# Patient Record
Sex: FEMALE | Race: WHITE | NOT HISPANIC OR LATINO
[De-identification: names, ages, dates, MRNs, and addresses within clinical notes are randomized per-mention and may not be internally consistent; named-entity substitution may affect disease eponyms.]

---

## 2020-11-09 ENCOUNTER — TRANSCRIPTION ENCOUNTER (OUTPATIENT)
Age: 65
End: 2020-11-09

## 2022-05-19 ENCOUNTER — OUTPATIENT (OUTPATIENT)
Dept: OUTPATIENT SERVICES | Facility: HOSPITAL | Age: 67
LOS: 1 days | End: 2022-05-19
Payer: MEDICARE

## 2022-05-19 VITALS
DIASTOLIC BLOOD PRESSURE: 70 MMHG | HEART RATE: 67 BPM | RESPIRATION RATE: 16 BRPM | TEMPERATURE: 97 F | HEIGHT: 62 IN | SYSTOLIC BLOOD PRESSURE: 133 MMHG | OXYGEN SATURATION: 98 % | WEIGHT: 134.92 LBS

## 2022-05-19 DIAGNOSIS — M20.41 OTHER HAMMER TOE(S) (ACQUIRED), RIGHT FOOT: ICD-10-CM

## 2022-05-19 DIAGNOSIS — E89.2 POSTPROCEDURAL HYPOPARATHYROIDISM: Chronic | ICD-10-CM

## 2022-05-19 DIAGNOSIS — M79.671 PAIN IN RIGHT FOOT: ICD-10-CM

## 2022-05-19 DIAGNOSIS — Z98.890 OTHER SPECIFIED POSTPROCEDURAL STATES: Chronic | ICD-10-CM

## 2022-05-19 LAB
ANION GAP SERPL CALC-SCNC: 11 MMOL/L — SIGNIFICANT CHANGE UP (ref 7–14)
BUN SERPL-MCNC: 22 MG/DL — SIGNIFICANT CHANGE UP (ref 7–23)
CALCIUM SERPL-MCNC: 9.7 MG/DL — SIGNIFICANT CHANGE UP (ref 8.4–10.5)
CHLORIDE SERPL-SCNC: 104 MMOL/L — SIGNIFICANT CHANGE UP (ref 98–107)
CO2 SERPL-SCNC: 26 MMOL/L — SIGNIFICANT CHANGE UP (ref 22–31)
CREAT SERPL-MCNC: 0.81 MG/DL — SIGNIFICANT CHANGE UP (ref 0.5–1.3)
EGFR: 79 ML/MIN/1.73M2 — SIGNIFICANT CHANGE UP
GLUCOSE SERPL-MCNC: 88 MG/DL — SIGNIFICANT CHANGE UP (ref 70–99)
HCT VFR BLD CALC: 39.8 % — SIGNIFICANT CHANGE UP (ref 34.5–45)
HGB BLD-MCNC: 12.9 G/DL — SIGNIFICANT CHANGE UP (ref 11.5–15.5)
MCHC RBC-ENTMCNC: 29.2 PG — SIGNIFICANT CHANGE UP (ref 27–34)
MCHC RBC-ENTMCNC: 32.4 GM/DL — SIGNIFICANT CHANGE UP (ref 32–36)
MCV RBC AUTO: 90 FL — SIGNIFICANT CHANGE UP (ref 80–100)
NRBC # BLD: 0 /100 WBCS — SIGNIFICANT CHANGE UP
NRBC # FLD: 0 K/UL — SIGNIFICANT CHANGE UP
PLATELET # BLD AUTO: 299 K/UL — SIGNIFICANT CHANGE UP (ref 150–400)
POTASSIUM SERPL-MCNC: 5 MMOL/L — SIGNIFICANT CHANGE UP (ref 3.5–5.3)
POTASSIUM SERPL-SCNC: 5 MMOL/L — SIGNIFICANT CHANGE UP (ref 3.5–5.3)
RBC # BLD: 4.42 M/UL — SIGNIFICANT CHANGE UP (ref 3.8–5.2)
RBC # FLD: 12.7 % — SIGNIFICANT CHANGE UP (ref 10.3–14.5)
SODIUM SERPL-SCNC: 141 MMOL/L — SIGNIFICANT CHANGE UP (ref 135–145)
WBC # BLD: 5.58 K/UL — SIGNIFICANT CHANGE UP (ref 3.8–10.5)
WBC # FLD AUTO: 5.58 K/UL — SIGNIFICANT CHANGE UP (ref 3.8–10.5)

## 2022-05-19 PROCEDURE — 93010 ELECTROCARDIOGRAM REPORT: CPT

## 2022-05-19 NOTE — H&P PST ADULT - PROBLEM SELECTOR PLAN 1
Patient tentatively scheduled for scheduled for right foot interphalangeal hallux fusion for 6/8/22. Pre-op instructions provided. Pt given verbal and written instructions with teach back on chlorhexidine shampoo and pepcid. Pt verbalized understanding with return demonstration.     Pt strongly advised to follow up with surgeon to discuss COVID testing requirements prior to procedure.     68 y/o F scheduled for a low risk procedure, no further evaluations requested.

## 2022-05-19 NOTE — H&P PST ADULT - NSANTHOSAYNRD_GEN_A_CORE
No. LLUVIA screening performed.  STOP BANG Legend: 0-2 = LOW Risk; 3-4 = INTERMEDIATE Risk; 5-8 = HIGH Risk

## 2022-05-19 NOTE — H&P PST ADULT - NSICDXPASTSURGICALHX_GEN_ALL_CORE_FT
PAST SURGICAL HISTORY:   delivery delivered     Cyst of Finger, Left thumb, tendon release Excision    D & C     H/O parathyroidectomy 2013    History of knee sprain knee arthroscopy 2009    History of Tonsillectomy     S/P Abdominoplasty     S/P bilateral breast reduction 2019    S/P Carpal Tunnel Release, right & trigger release right index finger     S/P Carpal Tunnel Release, trigger finger release, middle finger, Left     S/P Laminectomy L-5   L4 - L5-   L2-5, S1- 2003    S/P Laminectomy C4-7    Trigger Finger Release right middle    Uterine hypertrophy D and C -

## 2022-05-19 NOTE — H&P PST ADULT - MAMMOGRAM, LAST, PROFILE
Problem: Knowledge Deficit  Goal: Knowledge of disease process/condition, treatment plan, diagnostic tests, and medications will improve  Outcome: PROGRESSING AS EXPECTED  Patient updated on plan of care. All questions answered. Patient verbalized understanding.        2022

## 2022-05-19 NOTE — H&P PST ADULT - NEGATIVE ENMT SYMPTOMS
no hearing difficulty/no ear pain/no tinnitus/no sinus symptoms/no nose bleeds/no throat pain/no dysphagia

## 2022-05-19 NOTE — H&P PST ADULT - NSICDXPASTMEDICALHX_GEN_ALL_CORE_FT
PAST MEDICAL HISTORY:  Anxiety     Cervical disc disease     Endometrial Hyperplasia     H/O neuropathy     Hematuria, Microscopic     Lumbar Disc Disorder     Neuropathy 2003 - lower extremities    Osteoporosis     Sinusitis last 3 weeks ago    Vertigo     Vitamin D deficiency

## 2022-05-19 NOTE — H&P PST ADULT - HISTORY OF PRESENT ILLNESS
67 year old female presents to presurgical testing with diagnosis of right foot pain and other hammer toes scheduled for right foot interphalangeal hallux fusion. Pt with right foot pain since 10/2021, had a right hallux fracture, with persistent symptoms despite conservative management.

## 2022-05-19 NOTE — H&P PST ADULT - NSICDXFAMILYHX_GEN_ALL_CORE_FT
FAMILY HISTORY:  Father  Still living? Unknown  FH: type 2 diabetes, Age at diagnosis: Age Unknown    Mother  Still living? Unknown  FH: lymphoma, Age at diagnosis: Age Unknown

## 2022-06-07 ENCOUNTER — TRANSCRIPTION ENCOUNTER (OUTPATIENT)
Age: 67
End: 2022-06-07

## 2022-06-08 ENCOUNTER — TRANSCRIPTION ENCOUNTER (OUTPATIENT)
Age: 67
End: 2022-06-08

## 2022-06-08 ENCOUNTER — RESULT REVIEW (OUTPATIENT)
Age: 67
End: 2022-06-08

## 2022-06-08 ENCOUNTER — OUTPATIENT (OUTPATIENT)
Dept: OUTPATIENT SERVICES | Facility: HOSPITAL | Age: 67
LOS: 1 days | Discharge: HOME | End: 2022-06-08
Payer: MEDICARE

## 2022-06-08 VITALS
RESPIRATION RATE: 16 BRPM | SYSTOLIC BLOOD PRESSURE: 137 MMHG | DIASTOLIC BLOOD PRESSURE: 70 MMHG | HEART RATE: 65 BPM | OXYGEN SATURATION: 99 % | TEMPERATURE: 97 F

## 2022-06-08 VITALS
HEART RATE: 69 BPM | SYSTOLIC BLOOD PRESSURE: 140 MMHG | RESPIRATION RATE: 20 BRPM | DIASTOLIC BLOOD PRESSURE: 65 MMHG | HEIGHT: 62 IN | OXYGEN SATURATION: 98 % | TEMPERATURE: 96 F | WEIGHT: 134.92 LBS

## 2022-06-08 DIAGNOSIS — Z98.890 OTHER SPECIFIED POSTPROCEDURAL STATES: Chronic | ICD-10-CM

## 2022-06-08 DIAGNOSIS — M20.41 OTHER HAMMER TOE(S) (ACQUIRED), RIGHT FOOT: ICD-10-CM

## 2022-06-08 DIAGNOSIS — E89.2 POSTPROCEDURAL HYPOPARATHYROIDISM: Chronic | ICD-10-CM

## 2022-06-08 PROBLEM — R42 DIZZINESS AND GIDDINESS: Chronic | Status: ACTIVE | Noted: 2022-05-19

## 2022-06-08 PROBLEM — Z86.69 PERSONAL HISTORY OF OTHER DISEASES OF THE NERVOUS SYSTEM AND SENSE ORGANS: Chronic | Status: ACTIVE | Noted: 2022-05-19

## 2022-06-08 PROBLEM — F41.9 ANXIETY DISORDER, UNSPECIFIED: Chronic | Status: ACTIVE | Noted: 2022-05-19

## 2022-06-08 PROBLEM — M81.0 AGE-RELATED OSTEOPOROSIS WITHOUT CURRENT PATHOLOGICAL FRACTURE: Chronic | Status: ACTIVE | Noted: 2022-05-19

## 2022-06-08 PROCEDURE — 88311 DECALCIFY TISSUE: CPT | Mod: 26

## 2022-06-08 PROCEDURE — 88304 TISSUE EXAM BY PATHOLOGIST: CPT | Mod: 26

## 2022-06-08 PROCEDURE — 73630 X-RAY EXAM OF FOOT: CPT | Mod: 26,RT

## 2022-06-08 DEVICE — IMPLANTABLE DEVICE: Type: IMPLANTABLE DEVICE | Status: FUNCTIONAL

## 2022-06-08 NOTE — ASU PREOPERATIVE ASSESSMENT, ADULT (IPARK ONLY) - FALL HARM RISK - UNIVERSAL INTERVENTIONS
Bed in lowest position, wheels locked, appropriate side rails in place/Call bell, personal items and telephone in reach/Instruct patient to call for assistance before getting out of bed or chair/Non-slip footwear when patient is out of bed/[place] to call system/Physically safe environment - no spills, clutter or unnecessary equipment/Purposeful Proactive Rounding/Room/bathroom lighting operational, light cord in reach

## 2022-06-08 NOTE — ASU DISCHARGE PLAN (ADULT/PEDIATRIC) - CARE PROVIDER_API CALL
Russel Bustos (DPM)  Podiatric Medicine and Surgery  [address] [place] [address]  Phone: [phone]  Fax: [phone]  Follow Up Time:

## 2022-06-08 NOTE — ASU DISCHARGE PLAN (ADULT/PEDIATRIC) - ASU DC SPECIAL INSTRUCTIONSFT
Weight bear as tolerated to right foot in surgical shoe  Keep dressing clean, dry and intact until follow up  Take pain medications as needed for pain   Follow up with Dr. Bustos within 1 week of discharge

## 2022-06-08 NOTE — ASU DISCHARGE PLAN (ADULT/PEDIATRIC) - NS MD DC FALL RISK RISK
For information on Fall & Injury Prevention, visit: [email or web address] OR  [email or web address] OR  https://www.cdc.gov/steadi/patient.html

## 2022-06-20 LAB — SURGICAL PATHOLOGY STUDY: SIGNIFICANT CHANGE UP

## 2023-07-11 ENCOUNTER — NON-APPOINTMENT (OUTPATIENT)
Age: 68
End: 2023-07-11

## 2023-07-13 NOTE — ASU PREOPERATIVE ASSESSMENT, ADULT (IPARK ONLY) - PERSONAL BELONGINGS
I refilled metrogel 0.75% . Please call patient and ask her to  make appt with [facility] for further refills and annual pap smears.  [phone] lock

## 2023-08-02 ENCOUNTER — APPOINTMENT (OUTPATIENT)
Dept: ORTHOPEDIC SURGERY | Facility: CLINIC | Age: 68
End: 2023-08-02
Payer: MEDICARE

## 2023-08-02 VITALS
SYSTOLIC BLOOD PRESSURE: 146 MMHG | HEIGHT: 62 IN | WEIGHT: 129 LBS | DIASTOLIC BLOOD PRESSURE: 82 MMHG | TEMPERATURE: 98.5 F | BODY MASS INDEX: 23.74 KG/M2 | OXYGEN SATURATION: 97 % | HEART RATE: 77 BPM

## 2023-08-02 DIAGNOSIS — Z86.39 PERSONAL HISTORY OF OTHER ENDOCRINE, NUTRITIONAL AND METABOLIC DISEASE: ICD-10-CM

## 2023-08-02 DIAGNOSIS — Z87.39 PERSONAL HISTORY OF OTHER DISEASES OF THE MUSCULOSKELETAL SYSTEM AND CONNECTIVE TISSUE: ICD-10-CM

## 2023-08-02 DIAGNOSIS — Z78.9 OTHER SPECIFIED HEALTH STATUS: ICD-10-CM

## 2023-08-02 DIAGNOSIS — M48.062 SPINAL STENOSIS, LUMBAR REGION WITH NEUROGENIC CLAUDICATION: ICD-10-CM

## 2023-08-02 DIAGNOSIS — Z82.62 FAMILY HISTORY OF OSTEOPOROSIS: ICD-10-CM

## 2023-08-02 DIAGNOSIS — Z86.69 PERSONAL HISTORY OF OTHER DISEASES OF THE NERVOUS SYSTEM AND SENSE ORGANS: ICD-10-CM

## 2023-08-02 PROCEDURE — 99204 OFFICE O/P NEW MOD 45 MIN: CPT

## 2023-08-02 RX ORDER — DULOXETINE HYDROCHLORIDE 30 MG/1
30 CAPSULE, DELAYED RELEASE PELLETS ORAL
Refills: 0 | Status: ACTIVE | COMMUNITY

## 2023-08-02 RX ORDER — ADHESIVE TAPE 3"X 2.3 YD
50 MCG TAPE, NON-MEDICATED TOPICAL
Refills: 0 | Status: ACTIVE | COMMUNITY

## 2023-08-02 RX ORDER — ROSUVASTATIN CALCIUM 5 MG/1
5 TABLET, FILM COATED ORAL
Refills: 0 | Status: ACTIVE | COMMUNITY

## 2023-08-02 RX ORDER — MULTIVIT-MIN/FOLIC/VIT K/LYCOP 400-300MCG
25 MCG TABLET ORAL
Refills: 0 | Status: ACTIVE | COMMUNITY

## 2023-08-09 PROBLEM — M48.062 SPINAL STENOSIS OF LUMBAR REGION WITH NEUROGENIC CLAUDICATION: Status: ACTIVE | Noted: 2023-08-09

## 2023-08-09 NOTE — HISTORY OF PRESENT ILLNESS
[de-identified] : Pt presents with low back and bilateral leg pain.  Had lumbar sx in 1974.  In 1995, had a fall and another spine surgery.  In 2001 had C4-7 spinal fusion.   In 2003, laminectomy L4-5.  L low back pain has increased in last 3 months.  2 weeks ago, pain radiated to R lateral foot with electric shocks.   Went to ED, given morphine, medrol dose junaid, rx PT.   She is unable to walk 15 minutes as she becomes fatigued. Pain radiates LLE laterally.  H/o L GT bursitis, had steroid inj few weeks ago.

## 2023-08-09 NOTE — PHYSICAL EXAM
[de-identified] : NVI, except bilateral tib ant 4/5.  L lateral calf with decreased sensation.  +TTP L GT bursa.  [de-identified] : MRI lumbar spine 6/12/2023:  L1-2 severe central spinal stenosis, L3-4 R foraminal stenosis, L4-5 bilateral severe foraminal stenosis  Scoliosis xray 6/7/2023:  no gross instability

## 2023-08-09 NOTE — PHYSICAL EXAM
[de-identified] : NVI, except bilateral tib ant 4/5.  L lateral calf with decreased sensation.  +TTP L GT bursa.  [de-identified] : MRI lumbar spine 6/12/2023:  L1-2 severe central spinal stenosis, L3-4 R foraminal stenosis, L4-5 bilateral severe foraminal stenosis  Scoliosis xray 6/7/2023:  no gross instability

## 2023-08-09 NOTE — DISCUSSION/SUMMARY
[Surgical risks reviewed] : Surgical risks reviewed [de-identified] : A lengthy discussion was held with the patient regarding her condition.  We discussed nonoperative treatment strategies including physical therapy, pharmacologic management and steroid injections.  We discussed surgical options (decompression L1-2, L3-4, PSF D&I tlifs L4-S1) and the attendant benefits, alternatives, and risks, including but not limited to infection, bleeding, persistent pain, persistent neurologic deficit, neurologic injury, adjacent segment degeneration, and the need for future surgery.  The patient's questions were sought and answered satisfactorily.  IElisa NP am acting as a scribe for Dr. Frank Schwab.

## 2023-08-09 NOTE — HISTORY OF PRESENT ILLNESS
[de-identified] : Pt presents with low back and bilateral leg pain.  Had lumbar sx in 1974.  In 1995, had a fall and another spine surgery.  In 2001 had C4-7 spinal fusion.   In 2003, laminectomy L4-5.  L low back pain has increased in last 3 months.  2 weeks ago, pain radiated to R lateral foot with electric shocks.   Went to ED, given morphine, medrol dose junaid, rx PT.   She is unable to walk 15 minutes as she becomes fatigued. Pain radiates LLE laterally.  H/o L GT bursitis, had steroid inj few weeks ago.

## 2023-08-09 NOTE — REASON FOR VISIT
[Back Pain] : back pain [Radiculopathy] : radiculopathy [Initial Visit] : an initial visit for [Neck Pain] : neck pain

## 2023-08-09 NOTE — DISCUSSION/SUMMARY
[Surgical risks reviewed] : Surgical risks reviewed [de-identified] : A lengthy discussion was held with the patient regarding her condition.  We discussed nonoperative treatment strategies including physical therapy, pharmacologic management and steroid injections.  We discussed surgical options (decompression L1-2, L3-4, PSF D&I tlifs L4-S1) and the attendant benefits, alternatives, and risks, including but not limited to infection, bleeding, persistent pain, persistent neurologic deficit, neurologic injury, adjacent segment degeneration, and the need for future surgery.  The patient's questions were sought and answered satisfactorily.  IElisa NP am acting as a scribe for Dr. Frank Schwab.

## 2023-10-16 ENCOUNTER — TRANSCRIPTION ENCOUNTER (OUTPATIENT)
Age: 68
End: 2023-10-16

## 2023-10-16 VITALS
OXYGEN SATURATION: 97 % | RESPIRATION RATE: 16 BRPM | WEIGHT: 134.92 LBS | HEIGHT: 62 IN | SYSTOLIC BLOOD PRESSURE: 164 MMHG | HEART RATE: 77 BPM | DIASTOLIC BLOOD PRESSURE: 72 MMHG | TEMPERATURE: 97 F

## 2023-10-16 RX ORDER — GABAPENTIN 400 MG/1
1 CAPSULE ORAL
Qty: 0 | Refills: 0 | DISCHARGE

## 2023-10-16 RX ORDER — POVIDONE-IODINE 5 %
1 AEROSOL (ML) TOPICAL ONCE
Refills: 0 | Status: COMPLETED | OUTPATIENT
Start: 2023-10-17 | End: 2023-10-17

## 2023-10-16 RX ORDER — ALENDRONATE SODIUM 70 MG/1
1 TABLET ORAL
Qty: 0 | Refills: 0 | DISCHARGE

## 2023-10-16 RX ORDER — CHLORHEXIDINE GLUCONATE 213 G/1000ML
1 SOLUTION TOPICAL EVERY 12 HOURS
Refills: 0 | Status: DISCONTINUED | OUTPATIENT
Start: 2023-10-17 | End: 2023-10-22

## 2023-10-16 NOTE — H&P ADULT - PROBLEM SELECTOR PLAN 1
Admit to Orthopedic Service for elective posterior spinal decompression fusion L3-S1, TLIF L5-S1, minimally invasive decompression L1-L2     Medically cleared and optimized for surgery by Dr. Mira Kirkpatrick

## 2023-10-16 NOTE — PATIENT PROFILE ADULT - FALL HARM RISK - HARM RISK INTERVENTIONS

## 2023-10-16 NOTE — PATIENT PROFILE ADULT - DO YOU LACK THE NECESSARY SUPPORT TO HELP YOU COPE WITH LIFE CHALLENGES?
Dr Barry [place], Please refuse all medication requests in the is encounter  It has been corrected and sent in a separate encounter  This encounter was created in error - please disregard  no

## 2023-10-16 NOTE — PRE-OP CHECKLIST - 2.
patient used Hibiclens ones at home but but had a rash. Patient refused CHG wipes in pre op due to rash. (4) no impairment

## 2023-10-16 NOTE — H&P ADULT - NSHPLABSRESULTS_GEN_ALL_CORE
Preop CBC, BMP, PT/INR, PTT , UA within normal range and reviewed per medical clearance  Cr- 0.81  Preop CXR within normal limits and reviewed per medical clearance   Preop EKG 68bpm NSR within normal limits and reviewed per medical clearance  3M: DOS  T + S: DOS

## 2023-10-16 NOTE — H&P ADULT - NSHPPHYSICALEXAM_GEN_ALL_CORE
Gen: 68F NAD  MSK: Decreased lumbar spine ROM secondary to pain    Rest of PE per MD clearance Gen: 68F NAD  MSK: Decreased lumbar ROM secondary to pain  Skin without erythema, ecchymosis, abrasions or lesions, signs of infection  Calves soft, nontender bilaterally   Sensation intact to light touch left lower extremities; reports numbness at right shin and top of foot when compared to left  Pulses: DP2+ bilat LE brisk capillary refill  SLR intact bilat LE; mild decrease in strength left knee flex/ext compared to right; EHL/FHL/TA/GS 5/5 bilaterally     Rest of PE per MD clearance

## 2023-10-16 NOTE — H&P ADULT - HISTORY OF PRESENT ILLNESS
68F c/o lower back pain x     Presents today for a posterior spinal decompression fusion L3-S1, TLIF L5-S1, minimally invasive decompression L1-L2  68F c/o lower back pain x several years. She has had 4 previous spine surgeries and multiple other surgeries. She comes for surgery today because of continued pain in the back that radiates down the legs, L>R. Pain radiates down the lateral left leg and into the toes. She also notes pain at the left lateral hip region. She notes history of neuropathy in her bilateral feet.     Denies PMHx of DVT/PE, MI, stroke, DM2.    Presents today for a posterior spinal decompression fusion L3-S1, TLIF L5-S1, minimally invasive decompression L1-L2.    Patient cannot take Tramadol as she develops extreme pruritus. She has taken Oxycodone in the past without negative side effects. She has not tried Dilaudid. She believes she once received a dose of Morphine in the ED without negative side effects. She has taken Tizanadine without side effects.

## 2023-10-17 ENCOUNTER — INPATIENT (INPATIENT)
Facility: HOSPITAL | Age: 68
LOS: 4 days | Discharge: HOME HEALTH | DRG: 454 | End: 2023-10-22
Attending: ORTHOPAEDIC SURGERY | Admitting: ORTHOPAEDIC SURGERY
Payer: MEDICARE

## 2023-10-17 ENCOUNTER — APPOINTMENT (OUTPATIENT)
Dept: ORTHOPEDIC SURGERY | Facility: HOSPITAL | Age: 68
End: 2023-10-17
Payer: MEDICARE

## 2023-10-17 DIAGNOSIS — E55.9 VITAMIN D DEFICIENCY, UNSPECIFIED: ICD-10-CM

## 2023-10-17 DIAGNOSIS — F41.9 ANXIETY DISORDER, UNSPECIFIED: ICD-10-CM

## 2023-10-17 DIAGNOSIS — R42 DIZZINESS AND GIDDINESS: ICD-10-CM

## 2023-10-17 DIAGNOSIS — E89.2 POSTPROCEDURAL HYPOPARATHYROIDISM: Chronic | ICD-10-CM

## 2023-10-17 DIAGNOSIS — G62.9 POLYNEUROPATHY, UNSPECIFIED: ICD-10-CM

## 2023-10-17 DIAGNOSIS — Z98.890 OTHER SPECIFIED POSTPROCEDURAL STATES: Chronic | ICD-10-CM

## 2023-10-17 DIAGNOSIS — J32.9 CHRONIC SINUSITIS, UNSPECIFIED: ICD-10-CM

## 2023-10-17 DIAGNOSIS — M48.061 SPINAL STENOSIS, LUMBAR REGION WITHOUT NEUROGENIC CLAUDICATION: ICD-10-CM

## 2023-10-17 LAB
BLD GP AB SCN SERPL QL: NEGATIVE — SIGNIFICANT CHANGE UP
BLD GP AB SCN SERPL QL: NEGATIVE — SIGNIFICANT CHANGE UP
GLUCOSE BLDC GLUCOMTR-MCNC: 120 MG/DL — HIGH (ref 70–99)
GLUCOSE BLDC GLUCOMTR-MCNC: 120 MG/DL — HIGH (ref 70–99)
RH IG SCN BLD-IMP: POSITIVE — SIGNIFICANT CHANGE UP
RH IG SCN BLD-IMP: POSITIVE — SIGNIFICANT CHANGE UP

## 2023-10-17 PROCEDURE — 22216 INCIS ADDL SPINE SEGMENT: CPT | Mod: 62

## 2023-10-17 PROCEDURE — 20939 BONE MARROW ASPIR BONE GRFG: CPT

## 2023-10-17 PROCEDURE — 22216 INCIS ADDL SPINE SEGMENT: CPT

## 2023-10-17 PROCEDURE — 22614 ARTHRD PST TQ 1NTRSPC EA ADD: CPT

## 2023-10-17 PROCEDURE — 22633 ARTHRD CMBN 1NTRSPC LUMBAR: CPT | Mod: 62

## 2023-10-17 PROCEDURE — 22842 INSERT SPINE FIXATION DEVICE: CPT

## 2023-10-17 PROCEDURE — 63047 LAM FACETEC & FORAMOT LUMBAR: CPT | Mod: 59,62

## 2023-10-17 PROCEDURE — 22214 INCIS 1 VERTEBRAL SEG LUMBAR: CPT | Mod: 62

## 2023-10-17 PROCEDURE — 22853 INSJ BIOMECHANICAL DEVICE: CPT | Mod: 80

## 2023-10-17 PROCEDURE — 20939 BONE MARROW ASPIR BONE GRFG: CPT | Mod: 80

## 2023-10-17 PROCEDURE — 22614 ARTHRD PST TQ 1NTRSPC EA ADD: CPT | Mod: 62

## 2023-10-17 PROCEDURE — 63047 LAM FACETEC & FORAMOT LUMBAR: CPT | Mod: 62,59

## 2023-10-17 PROCEDURE — 22853 INSJ BIOMECHANICAL DEVICE: CPT

## 2023-10-17 PROCEDURE — 22842 INSERT SPINE FIXATION DEVICE: CPT | Mod: 80

## 2023-10-17 DEVICE — MATRIX COLLAGEN PURAPLY MZ POWDER 1000MG: Type: IMPLANTABLE DEVICE | Site: BILATERAL | Status: FUNCTIONAL

## 2023-10-17 DEVICE — IMPLANTABLE DEVICE: Type: IMPLANTABLE DEVICE | Site: BILATERAL | Status: FUNCTIONAL

## 2023-10-17 DEVICE — SURGIFLO HEMOSTATIC MATRIX KIT: Type: IMPLANTABLE DEVICE | Site: BILATERAL | Status: FUNCTIONAL

## 2023-10-17 DEVICE — SURGIFOAM PAD 8CM X 12.5CM X 10MM (100): Type: IMPLANTABLE DEVICE | Site: BILATERAL | Status: FUNCTIONAL

## 2023-10-17 DEVICE — CLOSURE TOP STD 5.5-6.0: Type: IMPLANTABLE DEVICE | Site: BILATERAL | Status: FUNCTIONAL

## 2023-10-17 DEVICE — SCREW VITAL 6.5X45MM: Type: IMPLANTABLE DEVICE | Site: BILATERAL | Status: FUNCTIONAL

## 2023-10-17 DEVICE — GRAFT BONE INFUSE KIT MED: Type: IMPLANTABLE DEVICE | Site: BILATERAL | Status: FUNCTIONAL

## 2023-10-17 DEVICE — MATRIX COLLAGEN PURAPLY AM 5X5CM 25SQ CM: Type: IMPLANTABLE DEVICE | Site: BILATERAL | Status: FUNCTIONAL

## 2023-10-17 DEVICE — SCREW VITAL 6.5X50MM: Type: IMPLANTABLE DEVICE | Site: BILATERAL | Status: FUNCTIONAL

## 2023-10-17 RX ORDER — POLYETHYLENE GLYCOL 3350 17 G/17G
17 POWDER, FOR SOLUTION ORAL AT BEDTIME
Refills: 0 | Status: DISCONTINUED | OUTPATIENT
Start: 2023-10-17 | End: 2023-10-22

## 2023-10-17 RX ORDER — ONDANSETRON 8 MG/1
4 TABLET, FILM COATED ORAL EVERY 6 HOURS
Refills: 0 | Status: DISCONTINUED | OUTPATIENT
Start: 2023-10-17 | End: 2023-10-22

## 2023-10-17 RX ORDER — ACETAMINOPHEN 500 MG
1000 TABLET ORAL EVERY 8 HOURS
Refills: 0 | Status: DISCONTINUED | OUTPATIENT
Start: 2023-10-17 | End: 2023-10-22

## 2023-10-17 RX ORDER — DULOXETINE HYDROCHLORIDE 30 MG/1
1 CAPSULE, DELAYED RELEASE ORAL
Refills: 0 | DISCHARGE

## 2023-10-17 RX ORDER — DULOXETINE HYDROCHLORIDE 30 MG/1
30 CAPSULE, DELAYED RELEASE ORAL DAILY
Refills: 0 | Status: DISCONTINUED | OUTPATIENT
Start: 2023-10-17 | End: 2023-10-22

## 2023-10-17 RX ORDER — METHOCARBAMOL 500 MG/1
500 TABLET, FILM COATED ORAL EVERY 8 HOURS
Refills: 0 | Status: DISCONTINUED | OUTPATIENT
Start: 2023-10-17 | End: 2023-10-19

## 2023-10-17 RX ORDER — OXYCODONE HYDROCHLORIDE 5 MG/1
10 TABLET ORAL EVERY 4 HOURS
Refills: 0 | Status: DISCONTINUED | OUTPATIENT
Start: 2023-10-17 | End: 2023-10-22

## 2023-10-17 RX ORDER — ACETAMINOPHEN 500 MG
1000 TABLET ORAL ONCE
Refills: 0 | Status: COMPLETED | OUTPATIENT
Start: 2023-10-17 | End: 2023-10-17

## 2023-10-17 RX ORDER — ENOXAPARIN SODIUM 100 MG/ML
40 INJECTION SUBCUTANEOUS EVERY 24 HOURS
Refills: 0 | Status: DISCONTINUED | OUTPATIENT
Start: 2023-10-18 | End: 2023-10-22

## 2023-10-17 RX ORDER — MAGNESIUM HYDROXIDE 400 MG/1
30 TABLET, CHEWABLE ORAL EVERY 12 HOURS
Refills: 0 | Status: DISCONTINUED | OUTPATIENT
Start: 2023-10-17 | End: 2023-10-22

## 2023-10-17 RX ORDER — PANTOPRAZOLE SODIUM 20 MG/1
40 TABLET, DELAYED RELEASE ORAL
Refills: 0 | Status: DISCONTINUED | OUTPATIENT
Start: 2023-10-17 | End: 2023-10-22

## 2023-10-17 RX ORDER — SODIUM CHLORIDE 9 MG/ML
1000 INJECTION, SOLUTION INTRAVENOUS
Refills: 0 | Status: DISCONTINUED | OUTPATIENT
Start: 2023-10-17 | End: 2023-10-22

## 2023-10-17 RX ORDER — DIPHENHYDRAMINE HCL 50 MG
25 CAPSULE ORAL EVERY 4 HOURS
Refills: 0 | Status: DISCONTINUED | OUTPATIENT
Start: 2023-10-17 | End: 2023-10-18

## 2023-10-17 RX ORDER — SENNA PLUS 8.6 MG/1
2 TABLET ORAL AT BEDTIME
Refills: 0 | Status: DISCONTINUED | OUTPATIENT
Start: 2023-10-17 | End: 2023-10-22

## 2023-10-17 RX ORDER — CEFAZOLIN SODIUM 1 G
2000 VIAL (EA) INJECTION EVERY 8 HOURS
Refills: 0 | Status: COMPLETED | OUTPATIENT
Start: 2023-10-17 | End: 2023-10-18

## 2023-10-17 RX ORDER — HYDROMORPHONE HYDROCHLORIDE 2 MG/ML
0.5 INJECTION INTRAMUSCULAR; INTRAVENOUS; SUBCUTANEOUS
Refills: 0 | Status: DISCONTINUED | OUTPATIENT
Start: 2023-10-17 | End: 2023-10-19

## 2023-10-17 RX ORDER — HYDROMORPHONE HYDROCHLORIDE 2 MG/ML
0.5 INJECTION INTRAMUSCULAR; INTRAVENOUS; SUBCUTANEOUS EVERY 4 HOURS
Refills: 0 | Status: DISCONTINUED | OUTPATIENT
Start: 2023-10-17 | End: 2023-10-20

## 2023-10-17 RX ORDER — OXYCODONE HYDROCHLORIDE 5 MG/1
5 TABLET ORAL EVERY 4 HOURS
Refills: 0 | Status: DISCONTINUED | OUTPATIENT
Start: 2023-10-17 | End: 2023-10-22

## 2023-10-17 RX ORDER — APREPITANT 80 MG/1
40 CAPSULE ORAL ONCE
Refills: 0 | Status: COMPLETED | OUTPATIENT
Start: 2023-10-17 | End: 2023-10-17

## 2023-10-17 RX ORDER — CHOLECALCIFEROL (VITAMIN D3) 125 MCG
1 CAPSULE ORAL
Qty: 0 | Refills: 0 | DISCHARGE

## 2023-10-17 RX ORDER — DEXAMETHASONE 0.5 MG/5ML
6 ELIXIR ORAL EVERY 6 HOURS
Refills: 0 | Status: DISCONTINUED | OUTPATIENT
Start: 2023-10-17 | End: 2023-10-22

## 2023-10-17 RX ADMIN — HYDROMORPHONE HYDROCHLORIDE 0.5 MILLIGRAM(S): 2 INJECTION INTRAMUSCULAR; INTRAVENOUS; SUBCUTANEOUS at 18:05

## 2023-10-17 RX ADMIN — SENNA PLUS 2 TABLET(S): 8.6 TABLET ORAL at 23:03

## 2023-10-17 RX ADMIN — Medication 25 MILLIGRAM(S): at 23:04

## 2023-10-17 RX ADMIN — Medication 6 MILLIGRAM(S): at 23:04

## 2023-10-17 RX ADMIN — Medication 1000 MILLIGRAM(S): at 23:03

## 2023-10-17 RX ADMIN — METHOCARBAMOL 500 MILLIGRAM(S): 500 TABLET, FILM COATED ORAL at 23:04

## 2023-10-17 RX ADMIN — OXYCODONE HYDROCHLORIDE 10 MILLIGRAM(S): 5 TABLET ORAL at 19:42

## 2023-10-17 RX ADMIN — Medication 1000 MILLIGRAM(S): at 23:19

## 2023-10-17 RX ADMIN — HYDROMORPHONE HYDROCHLORIDE 0.5 MILLIGRAM(S): 2 INJECTION INTRAMUSCULAR; INTRAVENOUS; SUBCUTANEOUS at 17:50

## 2023-10-17 RX ADMIN — Medication 100 MILLIGRAM(S): at 23:04

## 2023-10-17 RX ADMIN — OXYCODONE HYDROCHLORIDE 10 MILLIGRAM(S): 5 TABLET ORAL at 18:39

## 2023-10-17 RX ADMIN — Medication 1000 MILLIGRAM(S): at 12:00

## 2023-10-17 RX ADMIN — HYDROMORPHONE HYDROCHLORIDE 0.5 MILLIGRAM(S): 2 INJECTION INTRAMUSCULAR; INTRAVENOUS; SUBCUTANEOUS at 19:42

## 2023-10-17 RX ADMIN — Medication 1 APPLICATION(S): at 12:00

## 2023-10-17 RX ADMIN — Medication 25 MILLIGRAM(S): at 19:39

## 2023-10-17 RX ADMIN — APREPITANT 40 MILLIGRAM(S): 80 CAPSULE ORAL at 11:59

## 2023-10-17 NOTE — PROGRESS NOTE ADULT - SUBJECTIVE AND OBJECTIVE BOX
Ortho Post Op Check    Procedure: PSF L3-S1, :5-S1 TLIF  Surgeon: Schwab    Pt comfortable. Endorsing some mild itchiness around her face. pain controlled  Denies CP, SOB, N/V, numbness/tingling     Vital Signs Last 24 Hrs  T(C): 36.7 (10-17-23 @ 19:30), Max: 36.7 (10-17-23 @ 17:28)  T(F): 98 (10-17-23 @ 19:30), Max: 98 (10-17-23 @ 17:28)  HR: 70 (10-17-23 @ 19:30) (60 - 77)  BP: 132/63 (10-17-23 @ 19:30) (95/52 - 164/72)  BP(mean): 90 (10-17-23 @ 19:30) (71 - 90)  RR: 16 (10-17-23 @ 19:30) (12 - 19)  SpO2: 98% (10-17-23 @ 19:30) (97% - 100%)  I&O's Summary    17 Oct 2023 07:01  -  17 Oct 2023 19:39  --------------------------------------------------------  IN: 250 mL / OUT: 428 mL / NET: -178 mL        Physical Exam:  General: Pt Alert and oriented, NAD  HVx2, JPx1  [place]  BLE:  Pulses: 2+ radial or dp, pt pulses, wwp, cap refill <3 sec  Sensation: SILT in sural/saph/sp/dp/tibial or axillary/radial/median/ulnar distributions  Motor: EHL/FHL/TA/GS or axillary/AIN/PIN/ulnar firing          A/P: 68yFemale s/p  PSF L3-S1, L5-S1 TLIF with Dr. Schwab on 10/17  - Stable  - Pain Control  - f/u AM labs  - DVT ppx: SCD, Lovenox POD1  - Post op abx: periop ancef  - PT, WBS: WBAT  - Dispo: Pending PT    Aris Blandon, PGY2  Orthopaedic Surgery  [phone]

## 2023-10-17 NOTE — PRE-ANESTHESIA EVALUATION ADULT - NSATTENDATTESTRD_GEN_ALL_CORE
<-- Click to add NO significant Past Surgical History The patient has been re-examined and I agree with the above assessment or I updated with my findings.

## 2023-10-18 ENCOUNTER — TRANSCRIPTION ENCOUNTER (OUTPATIENT)
Age: 68
End: 2023-10-18

## 2023-10-18 LAB
ANION GAP SERPL CALC-SCNC: 9 MMOL/L — SIGNIFICANT CHANGE UP (ref 5–17)
ANION GAP SERPL CALC-SCNC: 9 MMOL/L — SIGNIFICANT CHANGE UP (ref 5–17)
ANISOCYTOSIS BLD QL: SLIGHT — SIGNIFICANT CHANGE UP
ANISOCYTOSIS BLD QL: SLIGHT — SIGNIFICANT CHANGE UP
BASOPHILS # BLD AUTO: 0 K/UL — SIGNIFICANT CHANGE UP (ref 0–0.2)
BASOPHILS # BLD AUTO: 0 K/UL — SIGNIFICANT CHANGE UP (ref 0–0.2)
BASOPHILS NFR BLD AUTO: 0 % — SIGNIFICANT CHANGE UP (ref 0–2)
BASOPHILS NFR BLD AUTO: 0 % — SIGNIFICANT CHANGE UP (ref 0–2)
BUN SERPL-MCNC: 17 MG/DL — SIGNIFICANT CHANGE UP (ref 7–23)
BUN SERPL-MCNC: 17 MG/DL — SIGNIFICANT CHANGE UP (ref 7–23)
CALCIUM SERPL-MCNC: 9.2 MG/DL — SIGNIFICANT CHANGE UP (ref 8.4–10.5)
CALCIUM SERPL-MCNC: 9.2 MG/DL — SIGNIFICANT CHANGE UP (ref 8.4–10.5)
CHLORIDE SERPL-SCNC: 101 MMOL/L — SIGNIFICANT CHANGE UP (ref 96–108)
CHLORIDE SERPL-SCNC: 101 MMOL/L — SIGNIFICANT CHANGE UP (ref 96–108)
CO2 SERPL-SCNC: 25 MMOL/L — SIGNIFICANT CHANGE UP (ref 22–31)
CO2 SERPL-SCNC: 25 MMOL/L — SIGNIFICANT CHANGE UP (ref 22–31)
CREAT SERPL-MCNC: 0.72 MG/DL — SIGNIFICANT CHANGE UP (ref 0.5–1.3)
CREAT SERPL-MCNC: 0.72 MG/DL — SIGNIFICANT CHANGE UP (ref 0.5–1.3)
EGFR: 90 ML/MIN/1.73M2 — SIGNIFICANT CHANGE UP
EGFR: 90 ML/MIN/1.73M2 — SIGNIFICANT CHANGE UP
EOSINOPHIL # BLD AUTO: 0 K/UL — SIGNIFICANT CHANGE UP (ref 0–0.5)
EOSINOPHIL # BLD AUTO: 0 K/UL — SIGNIFICANT CHANGE UP (ref 0–0.5)
EOSINOPHIL NFR BLD AUTO: 0 % — SIGNIFICANT CHANGE UP (ref 0–6)
EOSINOPHIL NFR BLD AUTO: 0 % — SIGNIFICANT CHANGE UP (ref 0–6)
GLUCOSE SERPL-MCNC: 148 MG/DL — HIGH (ref 70–99)
GLUCOSE SERPL-MCNC: 148 MG/DL — HIGH (ref 70–99)
HCT VFR BLD CALC: 30.7 % — LOW (ref 34.5–45)
HCT VFR BLD CALC: 30.7 % — LOW (ref 34.5–45)
HCV AB S/CO SERPL IA: 0.05 S/CO — SIGNIFICANT CHANGE UP
HCV AB S/CO SERPL IA: 0.05 S/CO — SIGNIFICANT CHANGE UP
HCV AB SERPL-IMP: SIGNIFICANT CHANGE UP
HCV AB SERPL-IMP: SIGNIFICANT CHANGE UP
HGB BLD-MCNC: 10.2 G/DL — LOW (ref 11.5–15.5)
HGB BLD-MCNC: 10.2 G/DL — LOW (ref 11.5–15.5)
LYMPHOCYTES # BLD AUTO: 0.28 K/UL — LOW (ref 1–3.3)
LYMPHOCYTES # BLD AUTO: 0.28 K/UL — LOW (ref 1–3.3)
LYMPHOCYTES # BLD AUTO: 1.8 % — LOW (ref 13–44)
LYMPHOCYTES # BLD AUTO: 1.8 % — LOW (ref 13–44)
MANUAL SMEAR VERIFICATION: SIGNIFICANT CHANGE UP
MANUAL SMEAR VERIFICATION: SIGNIFICANT CHANGE UP
MCHC RBC-ENTMCNC: 29.6 PG — SIGNIFICANT CHANGE UP (ref 27–34)
MCHC RBC-ENTMCNC: 29.6 PG — SIGNIFICANT CHANGE UP (ref 27–34)
MCHC RBC-ENTMCNC: 33.2 GM/DL — SIGNIFICANT CHANGE UP (ref 32–36)
MCHC RBC-ENTMCNC: 33.2 GM/DL — SIGNIFICANT CHANGE UP (ref 32–36)
MCV RBC AUTO: 89 FL — SIGNIFICANT CHANGE UP (ref 80–100)
MCV RBC AUTO: 89 FL — SIGNIFICANT CHANGE UP (ref 80–100)
MICROCYTES BLD QL: SLIGHT — SIGNIFICANT CHANGE UP
MICROCYTES BLD QL: SLIGHT — SIGNIFICANT CHANGE UP
MONOCYTES # BLD AUTO: 0.27 K/UL — SIGNIFICANT CHANGE UP (ref 0–0.9)
MONOCYTES # BLD AUTO: 0.27 K/UL — SIGNIFICANT CHANGE UP (ref 0–0.9)
MONOCYTES NFR BLD AUTO: 1.7 % — LOW (ref 2–14)
MONOCYTES NFR BLD AUTO: 1.7 % — LOW (ref 2–14)
NEUTROPHILS # BLD AUTO: 15.13 K/UL — HIGH (ref 1.8–7.4)
NEUTROPHILS # BLD AUTO: 15.13 K/UL — HIGH (ref 1.8–7.4)
NEUTROPHILS NFR BLD AUTO: 96.5 % — HIGH (ref 43–77)
NEUTROPHILS NFR BLD AUTO: 96.5 % — HIGH (ref 43–77)
OVALOCYTES BLD QL SMEAR: SLIGHT — SIGNIFICANT CHANGE UP
OVALOCYTES BLD QL SMEAR: SLIGHT — SIGNIFICANT CHANGE UP
PLAT MORPH BLD: NORMAL — SIGNIFICANT CHANGE UP
PLAT MORPH BLD: NORMAL — SIGNIFICANT CHANGE UP
PLATELET # BLD AUTO: 372 K/UL — SIGNIFICANT CHANGE UP (ref 150–400)
PLATELET # BLD AUTO: 372 K/UL — SIGNIFICANT CHANGE UP (ref 150–400)
POIKILOCYTOSIS BLD QL AUTO: SLIGHT — SIGNIFICANT CHANGE UP
POIKILOCYTOSIS BLD QL AUTO: SLIGHT — SIGNIFICANT CHANGE UP
POLYCHROMASIA BLD QL SMEAR: SLIGHT — SIGNIFICANT CHANGE UP
POLYCHROMASIA BLD QL SMEAR: SLIGHT — SIGNIFICANT CHANGE UP
POTASSIUM SERPL-MCNC: 4.2 MMOL/L — SIGNIFICANT CHANGE UP (ref 3.5–5.3)
POTASSIUM SERPL-MCNC: 4.2 MMOL/L — SIGNIFICANT CHANGE UP (ref 3.5–5.3)
POTASSIUM SERPL-SCNC: 4.2 MMOL/L — SIGNIFICANT CHANGE UP (ref 3.5–5.3)
POTASSIUM SERPL-SCNC: 4.2 MMOL/L — SIGNIFICANT CHANGE UP (ref 3.5–5.3)
RBC # BLD: 3.45 M/UL — LOW (ref 3.8–5.2)
RBC # BLD: 3.45 M/UL — LOW (ref 3.8–5.2)
RBC # FLD: 12.1 % — SIGNIFICANT CHANGE UP (ref 10.3–14.5)
RBC # FLD: 12.1 % — SIGNIFICANT CHANGE UP (ref 10.3–14.5)
RBC BLD AUTO: ABNORMAL
RBC BLD AUTO: ABNORMAL
SODIUM SERPL-SCNC: 135 MMOL/L — SIGNIFICANT CHANGE UP (ref 135–145)
SODIUM SERPL-SCNC: 135 MMOL/L — SIGNIFICANT CHANGE UP (ref 135–145)
WBC # BLD: 15.68 K/UL — HIGH (ref 3.8–10.5)
WBC # BLD: 15.68 K/UL — HIGH (ref 3.8–10.5)
WBC # FLD AUTO: 15.68 K/UL — HIGH (ref 3.8–10.5)
WBC # FLD AUTO: 15.68 K/UL — HIGH (ref 3.8–10.5)

## 2023-10-18 PROCEDURE — 99222 1ST HOSP IP/OBS MODERATE 55: CPT

## 2023-10-18 PROCEDURE — 72100 X-RAY EXAM L-S SPINE 2/3 VWS: CPT | Mod: 26

## 2023-10-18 RX ORDER — SODIUM CHLORIDE 9 MG/ML
1000 INJECTION, SOLUTION INTRAVENOUS ONCE
Refills: 0 | Status: COMPLETED | OUTPATIENT
Start: 2023-10-18 | End: 2023-10-18

## 2023-10-18 RX ORDER — LANOLIN ALCOHOL/MO/W.PET/CERES
5 CREAM (GRAM) TOPICAL AT BEDTIME
Refills: 0 | Status: DISCONTINUED | OUTPATIENT
Start: 2023-10-18 | End: 2023-10-22

## 2023-10-18 RX ORDER — HYDROXYZINE HCL 10 MG
25 TABLET ORAL EVERY 6 HOURS
Refills: 0 | Status: DISCONTINUED | OUTPATIENT
Start: 2023-10-18 | End: 2023-10-20

## 2023-10-18 RX ADMIN — DULOXETINE HYDROCHLORIDE 30 MILLIGRAM(S): 30 CAPSULE, DELAYED RELEASE ORAL at 21:25

## 2023-10-18 RX ADMIN — ENOXAPARIN SODIUM 40 MILLIGRAM(S): 100 INJECTION SUBCUTANEOUS at 21:25

## 2023-10-18 RX ADMIN — Medication 1000 MILLIGRAM(S): at 14:08

## 2023-10-18 RX ADMIN — SODIUM CHLORIDE 1000 MILLILITER(S): 9 INJECTION, SOLUTION INTRAVENOUS at 12:34

## 2023-10-18 RX ADMIN — Medication 1000 MILLIGRAM(S): at 22:00

## 2023-10-18 RX ADMIN — SENNA PLUS 2 TABLET(S): 8.6 TABLET ORAL at 21:25

## 2023-10-18 RX ADMIN — Medication 6 MILLIGRAM(S): at 06:48

## 2023-10-18 RX ADMIN — OXYCODONE HYDROCHLORIDE 10 MILLIGRAM(S): 5 TABLET ORAL at 07:28

## 2023-10-18 RX ADMIN — Medication 1000 MILLIGRAM(S): at 06:48

## 2023-10-18 RX ADMIN — POLYETHYLENE GLYCOL 3350 17 GRAM(S): 17 POWDER, FOR SOLUTION ORAL at 21:25

## 2023-10-18 RX ADMIN — OXYCODONE HYDROCHLORIDE 10 MILLIGRAM(S): 5 TABLET ORAL at 04:16

## 2023-10-18 RX ADMIN — OXYCODONE HYDROCHLORIDE 10 MILLIGRAM(S): 5 TABLET ORAL at 13:28

## 2023-10-18 RX ADMIN — Medication 6 MILLIGRAM(S): at 19:27

## 2023-10-18 RX ADMIN — Medication 25 MILLIGRAM(S): at 21:29

## 2023-10-18 RX ADMIN — METHOCARBAMOL 500 MILLIGRAM(S): 500 TABLET, FILM COATED ORAL at 21:25

## 2023-10-18 RX ADMIN — Medication 1000 MILLIGRAM(S): at 21:25

## 2023-10-18 RX ADMIN — Medication 1000 MILLIGRAM(S): at 07:11

## 2023-10-18 RX ADMIN — Medication 100 MILLIGRAM(S): at 07:11

## 2023-10-18 RX ADMIN — OXYCODONE HYDROCHLORIDE 10 MILLIGRAM(S): 5 TABLET ORAL at 08:00

## 2023-10-18 RX ADMIN — Medication 6 MILLIGRAM(S): at 12:36

## 2023-10-18 RX ADMIN — Medication 6 MILLIGRAM(S): at 23:28

## 2023-10-18 RX ADMIN — OXYCODONE HYDROCHLORIDE 10 MILLIGRAM(S): 5 TABLET ORAL at 22:25

## 2023-10-18 RX ADMIN — Medication 5 MILLIGRAM(S): at 23:28

## 2023-10-18 RX ADMIN — OXYCODONE HYDROCHLORIDE 10 MILLIGRAM(S): 5 TABLET ORAL at 14:28

## 2023-10-18 RX ADMIN — METHOCARBAMOL 500 MILLIGRAM(S): 500 TABLET, FILM COATED ORAL at 06:48

## 2023-10-18 RX ADMIN — Medication 25 MILLIGRAM(S): at 06:48

## 2023-10-18 RX ADMIN — Medication 25 MILLIGRAM(S): at 14:07

## 2023-10-18 RX ADMIN — METHOCARBAMOL 500 MILLIGRAM(S): 500 TABLET, FILM COATED ORAL at 14:08

## 2023-10-18 RX ADMIN — PANTOPRAZOLE SODIUM 40 MILLIGRAM(S): 20 TABLET, DELAYED RELEASE ORAL at 06:48

## 2023-10-18 RX ADMIN — OXYCODONE HYDROCHLORIDE 10 MILLIGRAM(S): 5 TABLET ORAL at 03:16

## 2023-10-18 RX ADMIN — OXYCODONE HYDROCHLORIDE 10 MILLIGRAM(S): 5 TABLET ORAL at 21:25

## 2023-10-18 NOTE — DISCHARGE NOTE PROVIDER - HOSPITAL COURSE
Admit to Orthopaedics 10/17/23 for L3-S1 PSF, L5-S1 TLIF    Perioperative Antibiotics  DVT prophylaxis  Physical Therapy  Pain Management   Medical Co Management

## 2023-10-18 NOTE — PROGRESS NOTE ADULT - SUBJECTIVE AND OBJECTIVE BOX
Ortho Note    Pt seen and examined at bedside with . Pt complaining of incisional low back pain radiating into buttocks bilaterally. Pt states she is extremely thirsty this am due to a salty breakfast. Pt reports itching from pain medication. Denies difficulty swallowing.  Denies CP, SOB, N/V, new numbness/tingling, dizziness,       Vital Signs Last 24 Hrs  T(C): 37.3 (18 Oct 2023 09:02), Max: 37.3 (18 Oct 2023 09:02)  T(F): 99.2 (18 Oct 2023 09:02), Max: 99.2 (18 Oct 2023 09:02)  HR: 77 (18 Oct 2023 09:02) (60 - 79)  BP: 115/61 (18 Oct 2023 09:02) (95/52 - 133/63)  BP(mean): 90 (17 Oct 2023 19:30) (71 - 90)  RR: 17 (18 Oct 2023 09:02) (12 - 19)  SpO2: 96% (18 Oct 2023 09:02) (94% - 100%)    Parameters below as of 18 Oct 2023 09:02  Patient On (Oxygen Delivery Method): room air      17 Oct 2023 07:01  -  18 Oct 2023 07:00  --------------------------------------------------------  IN: 250 mL / OUT: 878 mL / NET: -628 mL    18 Oct 2023 07:01  -  18 Oct 2023 15:23  --------------------------------------------------------  IN: 360 mL / OUT: 1695 mL / NET: -1335 mL        General: Pt Alert and oriented, NAD  DSG C/D/I- telfa, tegaderm, HV x 2, BRANDON x1 holding suction  Pulses: 2+ DP bilateral lower extremities  Calves soft, nontender bilaterally  Sensation: SILT distally bilateral lower extremities  Motor:   EHL/FHL/TA/GS: 5/5 bilaterally  rae in place draining yellow urine                          10.2   15.68 )-----------( 372      ( 18 Oct 2023 13:13 )             30.7     10-18    135  |  101  |  17  ----------------------------<  148<H>  4.2   |  25  |  0.72    Ca    9.2      18 Oct 2023 13:13        A/P: 68yFemale POD #1 s/p L3-S1 PSF, L5-S1 TLIF with Dr. Schwab  - [place]  - 1 L LR bolus ordered per medicine recs as patient appears dry this am  - Appreciate medicine recs  - Pain Control  - D/c benadryl and start atarax 25mg daily for itching  - Continue to monitor drain output  - Rae discontinued this morning, passed TOV   - DVT ppx: SCDs, lovenox to start tonight  - PT, WBS: WBAT  - Dispo: home with no needs pending drain output    Ortho Pager [phone] Ortho Note    Pt seen and examined at bedside with . Pt complaining of incisional low back pain radiating into buttocks bilaterally. Pt states she is extremely thirsty this am due to a salty breakfast. Pt reports itching from pain medication. Denies difficulty swallowing.  Denies CP, SOB, N/V, new numbness/tingling, dizziness,       Vital Signs Last 24 Hrs  T(C): 37.3 (18 Oct 2023 09:02), Max: 37.3 (18 Oct 2023 09:02)  T(F): 99.2 (18 Oct 2023 09:02), Max: 99.2 (18 Oct 2023 09:02)  HR: 77 (18 Oct 2023 09:02) (60 - 79)  BP: 115/61 (18 Oct 2023 09:02) (95/52 - 133/63)  BP(mean): 90 (17 Oct 2023 19:30) (71 - 90)  RR: 17 (18 Oct 2023 09:02) (12 - 19)  SpO2: 96% (18 Oct 2023 09:02) (94% - 100%)    Parameters below as of 18 Oct 2023 09:02  Patient On (Oxygen Delivery Method): room air      17 Oct 2023 07:01  -  18 Oct 2023 07:00  --------------------------------------------------------  IN: 250 mL / OUT: 878 mL / NET: -628 mL    18 Oct 2023 07:01  -  18 Oct 2023 15:23  --------------------------------------------------------  IN: 360 mL / OUT: 1695 mL / NET: -1335 mL        General: Pt Alert and oriented, NAD  DSG C/D/I- telfa, tegaderm, HV x 2, BRANDON x1 holding suction  Pulses: 2+ DP bilateral lower extremities  Calves soft, nontender bilaterally  Sensation: SILT distally bilateral lower extremities  Motor:   EHL/FHL/TA/GS: 5/5 bilaterally  rae in place draining yellow urine                          10.2   15.68 )-----------( 372      ( 18 Oct 2023 13:13 )             30.7     10-18    135  |  101  |  17  ----------------------------<  148<H>  4.2   |  25  |  0.72    Ca    9.2      18 Oct 2023 13:13        A/P: 68yFemale POD #1 s/p L3-S1 PSF, L5-S1 TLIF with Dr. Schwab  - [place]  - 1 L LR bolus ordered per medicine recs as patient appears dry this am and reports hx of orthostatic hypotension  - Appreciate medicine recs  - Pain Control  - D/c benadryl and start atarax 25mg daily for itching  - Continue to monitor drain output  - Rae discontinued this morning, passed TOV   - DVT ppx: SCDs, lovenox to start tonight  - PT, WBS: WBAT  - Dispo: home with no needs pending drain output    Ortho Pager [phone] Ortho Note    Pt seen and examined at bedside with . Pt complaining of incisional low back pain radiating into buttocks bilaterally. Pt states she is extremely thirsty this am due to a salty breakfast. Pt reports itching from pain medication. Denies difficulty swallowing.  Denies CP, SOB, N/V, new numbness/tingling, dizziness,       Vital Signs Last 24 Hrs  T(C): 37.3 (18 Oct 2023 09:02), Max: 37.3 (18 Oct 2023 09:02)  T(F): 99.2 (18 Oct 2023 09:02), Max: 99.2 (18 Oct 2023 09:02)  HR: 77 (18 Oct 2023 09:02) (60 - 79)  BP: 115/61 (18 Oct 2023 09:02) (95/52 - 133/63)  BP(mean): 90 (17 Oct 2023 19:30) (71 - 90)  RR: 17 (18 Oct 2023 09:02) (12 - 19)  SpO2: 96% (18 Oct 2023 09:02) (94% - 100%)    Parameters below as of 18 Oct 2023 09:02  Patient On (Oxygen Delivery Method): room air      17 Oct 2023 07:01  -  18 Oct 2023 07:00  --------------------------------------------------------  IN: 250 mL / OUT: 878 mL / NET: -628 mL    18 Oct 2023 07:01  -  18 Oct 2023 15:23  --------------------------------------------------------  IN: 360 mL / OUT: 1695 mL / NET: -1335 mL        General: Pt Alert and oriented, NAD  DSG C/D/I- telfa, tegaderm, HV x 2, BRANDON x1 holding suction  Pulses: 2+ DP bilateral lower extremities  Calves soft, nontender bilaterally  Sensation: SILT distally bilateral lower extremities  Motor:   EHL/FHL/TA/GS: 5/5 bilaterally  rae in place draining yellow urine                          10.2   15.68 )-----------( 372      ( 18 Oct 2023 13:13 )             30.7     10-18    135  |  101  |  17  ----------------------------<  148<H>  4.2   |  25  |  0.72    Ca    9.2      18 Oct 2023 13:13        A/P: 68yFemale POD #1 s/p L3-S1 PSF, L5-S1 TLIF with Dr. Schwab  - [place]  - 1 L LR bolus ordered per medicine recs as patient appears dry this am and reports hx of orthostatic hypotension  - Appreciate medicine recs  - Pain Control  - D/c benadryl and start atarax 25mg daily for itching  - Continue to monitor drain output  - Rae discontinued this morning, passed TOV   - DVT ppx: SCDs, lovenox to start tonight  - PT, WBS: WBAT  - Pt requires bedside commode due to functional limitation post operatively   - Dispo: home with no needs pending drain output    Ortho Pager [phone]

## 2023-10-18 NOTE — PHYSICAL THERAPY INITIAL EVALUATION ADULT - THERAPY FREQUENCY, PT EVAL
Patient educated on frequency of inpatient physical therapy at [facility], patient verbalized understanding./daily

## 2023-10-18 NOTE — CONSULT NOTE ADULT - ASSESSMENT
68 YOF with PMH Of mood disorder, osteoporosis, lumbar stenosis s/p multiple back surgeries admitted for elective PSF L3-S1, L5-S1 TLIF s/p OR with Dr. Schwab on 10/17      Chronic back pain  Lumbar stenosis s/p PSF L3-S1, L5-S1 TLIF  - management per ortho, s/p OR with Dr. Schwab on 10/17  - pain control with bowel regimen, frequent perioperative incentive spirometer use  - chemical DVT ppx with LMWH   - routine labs pending, TOV pending (rae removed today)    Dehydration  - dry on exam  - endorses history of orthostatic hypotension post-op  - recommend 1L LR bolus   - encourage PO hydration     Generalized pruritis  - no rash, no SOB, no GI sx  - no improvement with diphenhydramine  - can trial hydroxyzine PRN    Mood disorder  - cont home duloxetine 30mg    Dispo: pending drain removal and PT eval    Plan discussed with primary team.

## 2023-10-18 NOTE — DISCHARGE NOTE PROVIDER - NSDCMRMEDTOKEN_GEN_ALL_CORE_FT
collagen with hyaluronic acid 1 tab daily last dose on 6/1/22:   DULoxetine 30 mg oral delayed release capsule: 1 cap(s) orally once a day (at bedtime)  meloxicam 15 mg oral tablet: 1 tab(s) orally once a day  last dose on 6/1/22  Vitamin D3: 1 tab(s) orally once a day   acetaminophen 500 mg oral tablet: 2 tab(s) orally every 8 hours  collagen with hyaluronic acid 1 tab daily last dose on 6/1/22:   DULoxetine 30 mg oral delayed release capsule: 1 cap(s) orally once a day (at bedtime)  meloxicam 15 mg oral tablet: 1 tab(s) orally once a day  last dose on 6/1/22  oxyCODONE 5 mg oral tablet: 1 tab(s) orally every 4 to 6 hours as needed for Moderate Pain (4 - 6) Take 1-2 tabs every 4-6h as needed for moderate to severe pain MDD: 6  senna leaf extract oral tablet: 2 tab(s) orally once a day (at bedtime)  tiZANidine 2 mg oral tablet: 1 tab(s) orally every 12 hours as needed for Muscle Spasm MDD: 2  Vitamin D3: 1 tab(s) orally once a day

## 2023-10-18 NOTE — PHYSICAL THERAPY INITIAL EVALUATION ADULT - MANUAL MUSCLE TESTING RESULTS, REHAB EVAL
Grossly assessed with functional movement, bilateral UE/LE greater than or equal to 3+/5 // B ankle PF/DF, EHL/FHL 5/5

## 2023-10-18 NOTE — DISCHARGE NOTE PROVIDER - NSDCCPTREATMENT_GEN_ALL_CORE_FT
PRINCIPAL PROCEDURE  Procedure: Fusion, spine, lumbar, TLIF, 1-2 levels  Findings and Treatment:

## 2023-10-18 NOTE — PHYSICAL THERAPY INITIAL EVALUATION ADULT - NSPTDISCHREC_GEN_A_CORE
Benazepril 40 mg was causing itching and headache. BP was in the 170s systolic.  Has previously tolerated amlodipine-benazepril 5/10 mg and wants refill.    Rx refilled  Future Appointments   Date Time Provider Department Center   10/11/2019 11:45 AM [facility]   10/11/2019  3:40 PM [facility]        Home, no PT needs

## 2023-10-18 NOTE — PHYSICAL THERAPY INITIAL EVALUATION ADULT - PERTINENT HX OF CURRENT PROBLEM, REHAB EVAL
68F c/o lower back pain x several years. She has had 4 previous spine surgeries and multiple other surgeries. She comes for surgery today because of continued pain in the back that radiates down the legs, L>R. Pain radiates down the lateral left leg and into the toes. She also notes pain at the left lateral hip region. She notes history of neuropathy in her bilateral feet. S/P L3-S1 PSF, L5-S1 TLIF 10/17.

## 2023-10-18 NOTE — PHYSICAL THERAPY INITIAL EVALUATION ADULT - ADDITIONAL COMMENTS
Pt reports living in house with 1 RAYA, with . Reports that  is out of house to work during the week, but will be home for first few days once D/C from [facility]. Pt reports being independent with daily activities without use of DME. Reports having cane at home. Reports having walk-in shower and grab bar.

## 2023-10-18 NOTE — PROGRESS NOTE ADULT - SUBJECTIVE AND OBJECTIVE BOX
Ortho Floor Note    Procedure: PSF L3-S1, L5-S1 TLIF  Surgeon: Schwab    Patient complaining of lower back pain, controlled. Also ? itching not relieved by benadryl.  Denies CP, SOB, N/V, numbness/tingling     Vital Signs Last 24 Hrs  T(C): 36.9 (18 Oct 2023 05:45), Max: 36.9 (18 Oct 2023 05:45)  T(F): 98.4 (18 Oct 2023 05:45), Max: 98.4 (18 Oct 2023 05:45)  HR: 67 (18 Oct 2023 05:45) (60 - 79)  BP: 133/59 (18 Oct 2023 05:45) (95/52 - 164/72)  BP(mean): 90 (17 Oct 2023 19:30) (71 - 90)  RR: 18 (18 Oct 2023 05:45) (12 - 19)  SpO2: 98% (18 Oct 2023 05:45) (94% - 100%)    Parameters below as of 18 Oct 2023 05:45  Patient On (Oxygen Delivery Method): room air      Physical Exam:  General: Pt Alert and oriented, NAD  HVx2, JPx1  [place]  BLE:  Pulses: 2+ radial or dp, pt pulses, wwp, cap refill <3 sec  B/l LE:  Sensation: SILT L2-S1  Motor:   L2 (hip flexion)  5/5   L3 (knee extension)  5/5   L4 (ankle dorsiflexion)  5/5   L5 (long toe extension) 5/5   S1 (ankle plantar flexion) 5/5      A/P: 68yFemale s/p  PSF L3-S1, L5-S1 TLIF with Dr. Schwab on 10/17.  - Stable  - Pain Control  - f/u AM labs  - DVT ppx: SCD, Lovenox POD1  - Post op abx: periop ancef  - PT, WBS: WBAT  - Monitor drain output  - Dispo: Pending PT

## 2023-10-18 NOTE — DISCHARGE NOTE PROVIDER - NSDCFUADDINST_GEN_ALL_CORE_FT
ACTIVITY:   - No extreme bending, extending, turning, twisting, or straining. No strenuous activity, heavy lifting, driving or returning to work until cleared by your surgeon.     DRESSING/SHOWERING:    -May shower post-op day 1, pat dry afterwards. Dressing is water-resistant. Do not soak in bathtubs. Do not need to cover with another dressing. Do not remove mesh dressing – it will be taken care of in your follow up appointment. Do not apply ointments, creams or oils to incision area.     MEDICATION/ANTICOAGULATION:   - You have been prescribed medications for pain:     - Tylenol (Acetaminophen) for mild to moderate pain. Do not exceed 3,000mg daily.     - For more severe pain, you may continue to take the Tylenol with the addition of narcotic pain medication. Take this medication as prescribed. Do not take more than prescribed. Note that this medication may cause drowsiness or dizziness. Do not operate machinery. This medication may cause constipation.   - If you have been prescribed a muscle relaxer, take this medication as needed for muscle spasm. Follow instructions on bottle.   - Try to have regular bowel movements. Take stool softener or laxative if necessary. You may wish to take Miralax daily until you have regular bowel movements.    - Do not take antiinflammatories (Aleve, Advil, Naproxen, Ibuprofen, etc.) until cleared by your surgeon. Tylenol is not an anti-inflammatory and okay to take (see above).   - If you have a pain management physician, please follow-up with them postoperatively.    - If you experience any negative side effects of your medications, please call your surgeon's office to discuss.     FOLLOW UP:   - Call to schedule an appt with Dr. Schwab for follow up.    - Please follow-up with your primary care physician or any other specialist you see postoperatively, if needed.    - Contact your doctor or go to the emergency room if you experience: fever greater than 101.5, chills, chest pain, difficulty breathing, redness or excessive drainage around the incision, other concerns.

## 2023-10-18 NOTE — DISCHARGE NOTE PROVIDER - CARE PROVIDER_API CALL
Schwab, Frank Johann  Orthopaedic Surgery  [address]  [place] [address]  Phone: [phone]  Fax: [phone]  Follow Up Time:

## 2023-10-18 NOTE — PHYSICAL THERAPY INITIAL EVALUATION ADULT - GENERAL OBSERVATIONS, REHAB EVAL
PT IE completed. Chart reviewed. Pt received semi-supine, NAD, +hemovac x 2, +BRANDON, +IV heplock, +B/L SCDs. ALAN Foster cleared pt for PT.

## 2023-10-19 LAB
ANION GAP SERPL CALC-SCNC: 8 MMOL/L — SIGNIFICANT CHANGE UP (ref 5–17)
ANION GAP SERPL CALC-SCNC: 8 MMOL/L — SIGNIFICANT CHANGE UP (ref 5–17)
BASOPHILS # BLD AUTO: 0.01 K/UL — SIGNIFICANT CHANGE UP (ref 0–0.2)
BASOPHILS # BLD AUTO: 0.01 K/UL — SIGNIFICANT CHANGE UP (ref 0–0.2)
BASOPHILS NFR BLD AUTO: 0.1 % — SIGNIFICANT CHANGE UP (ref 0–2)
BASOPHILS NFR BLD AUTO: 0.1 % — SIGNIFICANT CHANGE UP (ref 0–2)
BUN SERPL-MCNC: 19 MG/DL — SIGNIFICANT CHANGE UP (ref 7–23)
BUN SERPL-MCNC: 19 MG/DL — SIGNIFICANT CHANGE UP (ref 7–23)
CALCIUM SERPL-MCNC: 9.2 MG/DL — SIGNIFICANT CHANGE UP (ref 8.4–10.5)
CALCIUM SERPL-MCNC: 9.2 MG/DL — SIGNIFICANT CHANGE UP (ref 8.4–10.5)
CHLORIDE SERPL-SCNC: 104 MMOL/L — SIGNIFICANT CHANGE UP (ref 96–108)
CHLORIDE SERPL-SCNC: 104 MMOL/L — SIGNIFICANT CHANGE UP (ref 96–108)
CO2 SERPL-SCNC: 27 MMOL/L — SIGNIFICANT CHANGE UP (ref 22–31)
CO2 SERPL-SCNC: 27 MMOL/L — SIGNIFICANT CHANGE UP (ref 22–31)
CREAT SERPL-MCNC: 0.83 MG/DL — SIGNIFICANT CHANGE UP (ref 0.5–1.3)
CREAT SERPL-MCNC: 0.83 MG/DL — SIGNIFICANT CHANGE UP (ref 0.5–1.3)
EGFR: 76 ML/MIN/1.73M2 — SIGNIFICANT CHANGE UP
EGFR: 76 ML/MIN/1.73M2 — SIGNIFICANT CHANGE UP
EOSINOPHIL # BLD AUTO: 0 K/UL — SIGNIFICANT CHANGE UP (ref 0–0.5)
EOSINOPHIL # BLD AUTO: 0 K/UL — SIGNIFICANT CHANGE UP (ref 0–0.5)
EOSINOPHIL NFR BLD AUTO: 0 % — SIGNIFICANT CHANGE UP (ref 0–6)
EOSINOPHIL NFR BLD AUTO: 0 % — SIGNIFICANT CHANGE UP (ref 0–6)
GLUCOSE SERPL-MCNC: 170 MG/DL — HIGH (ref 70–99)
GLUCOSE SERPL-MCNC: 170 MG/DL — HIGH (ref 70–99)
HCT VFR BLD CALC: 27.3 % — LOW (ref 34.5–45)
HCT VFR BLD CALC: 27.3 % — LOW (ref 34.5–45)
HGB BLD-MCNC: 8.9 G/DL — LOW (ref 11.5–15.5)
HGB BLD-MCNC: 8.9 G/DL — LOW (ref 11.5–15.5)
IMM GRANULOCYTES NFR BLD AUTO: 0.5 % — SIGNIFICANT CHANGE UP (ref 0–0.9)
IMM GRANULOCYTES NFR BLD AUTO: 0.5 % — SIGNIFICANT CHANGE UP (ref 0–0.9)
LYMPHOCYTES # BLD AUTO: 0.41 K/UL — LOW (ref 1–3.3)
LYMPHOCYTES # BLD AUTO: 0.41 K/UL — LOW (ref 1–3.3)
LYMPHOCYTES # BLD AUTO: 2.8 % — LOW (ref 13–44)
LYMPHOCYTES # BLD AUTO: 2.8 % — LOW (ref 13–44)
MCHC RBC-ENTMCNC: 29.6 PG — SIGNIFICANT CHANGE UP (ref 27–34)
MCHC RBC-ENTMCNC: 29.6 PG — SIGNIFICANT CHANGE UP (ref 27–34)
MCHC RBC-ENTMCNC: 32.6 GM/DL — SIGNIFICANT CHANGE UP (ref 32–36)
MCHC RBC-ENTMCNC: 32.6 GM/DL — SIGNIFICANT CHANGE UP (ref 32–36)
MCV RBC AUTO: 90.7 FL — SIGNIFICANT CHANGE UP (ref 80–100)
MCV RBC AUTO: 90.7 FL — SIGNIFICANT CHANGE UP (ref 80–100)
MONOCYTES # BLD AUTO: 0.71 K/UL — SIGNIFICANT CHANGE UP (ref 0–0.9)
MONOCYTES # BLD AUTO: 0.71 K/UL — SIGNIFICANT CHANGE UP (ref 0–0.9)
MONOCYTES NFR BLD AUTO: 4.8 % — SIGNIFICANT CHANGE UP (ref 2–14)
MONOCYTES NFR BLD AUTO: 4.8 % — SIGNIFICANT CHANGE UP (ref 2–14)
NEUTROPHILS # BLD AUTO: 13.61 K/UL — HIGH (ref 1.8–7.4)
NEUTROPHILS # BLD AUTO: 13.61 K/UL — HIGH (ref 1.8–7.4)
NEUTROPHILS NFR BLD AUTO: 91.8 % — HIGH (ref 43–77)
NEUTROPHILS NFR BLD AUTO: 91.8 % — HIGH (ref 43–77)
NRBC # BLD: 0 /100 WBCS — SIGNIFICANT CHANGE UP (ref 0–0)
NRBC # BLD: 0 /100 WBCS — SIGNIFICANT CHANGE UP (ref 0–0)
PLATELET # BLD AUTO: 261 K/UL — SIGNIFICANT CHANGE UP (ref 150–400)
PLATELET # BLD AUTO: 261 K/UL — SIGNIFICANT CHANGE UP (ref 150–400)
POTASSIUM SERPL-MCNC: 5.2 MMOL/L — SIGNIFICANT CHANGE UP (ref 3.5–5.3)
POTASSIUM SERPL-MCNC: 5.2 MMOL/L — SIGNIFICANT CHANGE UP (ref 3.5–5.3)
POTASSIUM SERPL-SCNC: 5.2 MMOL/L — SIGNIFICANT CHANGE UP (ref 3.5–5.3)
POTASSIUM SERPL-SCNC: 5.2 MMOL/L — SIGNIFICANT CHANGE UP (ref 3.5–5.3)
RBC # BLD: 3.01 M/UL — LOW (ref 3.8–5.2)
RBC # BLD: 3.01 M/UL — LOW (ref 3.8–5.2)
RBC # FLD: 12.2 % — SIGNIFICANT CHANGE UP (ref 10.3–14.5)
RBC # FLD: 12.2 % — SIGNIFICANT CHANGE UP (ref 10.3–14.5)
SODIUM SERPL-SCNC: 139 MMOL/L — SIGNIFICANT CHANGE UP (ref 135–145)
SODIUM SERPL-SCNC: 139 MMOL/L — SIGNIFICANT CHANGE UP (ref 135–145)
WBC # BLD: 14.81 K/UL — HIGH (ref 3.8–10.5)
WBC # BLD: 14.81 K/UL — HIGH (ref 3.8–10.5)
WBC # FLD AUTO: 14.81 K/UL — HIGH (ref 3.8–10.5)
WBC # FLD AUTO: 14.81 K/UL — HIGH (ref 3.8–10.5)

## 2023-10-19 PROCEDURE — 99232 SBSQ HOSP IP/OBS MODERATE 35: CPT

## 2023-10-19 RX ORDER — TIZANIDINE 4 MG/1
2 TABLET ORAL THREE TIMES A DAY
Refills: 0 | Status: DISCONTINUED | OUTPATIENT
Start: 2023-10-19 | End: 2023-10-20

## 2023-10-19 RX ORDER — SIMETHICONE 80 MG/1
80 TABLET, CHEWABLE ORAL EVERY 6 HOURS
Refills: 0 | Status: DISCONTINUED | OUTPATIENT
Start: 2023-10-19 | End: 2023-10-22

## 2023-10-19 RX ADMIN — METHOCARBAMOL 500 MILLIGRAM(S): 500 TABLET, FILM COATED ORAL at 06:08

## 2023-10-19 RX ADMIN — PANTOPRAZOLE SODIUM 40 MILLIGRAM(S): 20 TABLET, DELAYED RELEASE ORAL at 06:08

## 2023-10-19 RX ADMIN — Medication 1000 MILLIGRAM(S): at 06:50

## 2023-10-19 RX ADMIN — METHOCARBAMOL 500 MILLIGRAM(S): 500 TABLET, FILM COATED ORAL at 13:15

## 2023-10-19 RX ADMIN — Medication 1000 MILLIGRAM(S): at 13:15

## 2023-10-19 RX ADMIN — POLYETHYLENE GLYCOL 3350 17 GRAM(S): 17 POWDER, FOR SOLUTION ORAL at 21:51

## 2023-10-19 RX ADMIN — OXYCODONE HYDROCHLORIDE 10 MILLIGRAM(S): 5 TABLET ORAL at 19:50

## 2023-10-19 RX ADMIN — DULOXETINE HYDROCHLORIDE 30 MILLIGRAM(S): 30 CAPSULE, DELAYED RELEASE ORAL at 21:51

## 2023-10-19 RX ADMIN — HYDROMORPHONE HYDROCHLORIDE 0.5 MILLIGRAM(S): 2 INJECTION INTRAMUSCULAR; INTRAVENOUS; SUBCUTANEOUS at 22:39

## 2023-10-19 RX ADMIN — Medication 5 MILLIGRAM(S): at 21:51

## 2023-10-19 RX ADMIN — TIZANIDINE 2 MILLIGRAM(S): 4 TABLET ORAL at 21:51

## 2023-10-19 RX ADMIN — Medication 1000 MILLIGRAM(S): at 21:51

## 2023-10-19 RX ADMIN — OXYCODONE HYDROCHLORIDE 5 MILLIGRAM(S): 5 TABLET ORAL at 12:07

## 2023-10-19 RX ADMIN — OXYCODONE HYDROCHLORIDE 10 MILLIGRAM(S): 5 TABLET ORAL at 18:50

## 2023-10-19 RX ADMIN — ONDANSETRON 4 MILLIGRAM(S): 8 TABLET, FILM COATED ORAL at 18:51

## 2023-10-19 RX ADMIN — SENNA PLUS 2 TABLET(S): 8.6 TABLET ORAL at 21:51

## 2023-10-19 RX ADMIN — ENOXAPARIN SODIUM 40 MILLIGRAM(S): 100 INJECTION SUBCUTANEOUS at 21:51

## 2023-10-19 RX ADMIN — OXYCODONE HYDROCHLORIDE 5 MILLIGRAM(S): 5 TABLET ORAL at 11:07

## 2023-10-19 RX ADMIN — ONDANSETRON 4 MILLIGRAM(S): 8 TABLET, FILM COATED ORAL at 11:07

## 2023-10-19 RX ADMIN — Medication 1000 MILLIGRAM(S): at 22:00

## 2023-10-19 RX ADMIN — HYDROMORPHONE HYDROCHLORIDE 0.5 MILLIGRAM(S): 2 INJECTION INTRAMUSCULAR; INTRAVENOUS; SUBCUTANEOUS at 21:50

## 2023-10-19 RX ADMIN — Medication 1000 MILLIGRAM(S): at 06:08

## 2023-10-19 NOTE — PROGRESS NOTE ADULT - SUBJECTIVE AND OBJECTIVE BOX
Ortho Floor Note    Procedure: PSF L3-S1, L5-S1 TLIF  Surgeon: Schwab    Continues to ? of itching, as well as an odd full body sensation with certain IV medications.  Denies CP, SOB, N/V, numbness/tingling     Vital Signs Last 24 Hrs  T(C): 36.9 (19 Oct 2023 08:37), Max: 36.9 (19 Oct 2023 08:37)  T(F): 98.5 (19 Oct 2023 08:37), Max: 98.5 (19 Oct 2023 08:37)  HR: 81 (19 Oct 2023 08:37) (73 - 81)  BP: 135/66 (19 Oct 2023 08:37) (115/65 - 135/66)  BP(mean): --  RR: 17 (19 Oct 2023 08:37) (17 - 18)  SpO2: 100% (19 Oct 2023 08:37) (95% - 100%)    Parameters below as of 19 Oct 2023 08:37  Patient On (Oxygen Delivery Method): room air    Physical Exam:  General: Pt Alert and oriented, NAD  HVx2, JPx1  [place]  BLE:  Pulses: 2+ radial or dp, pt pulses, wwp, cap refill <3 sec  B/l LE:  Sensation: SILT L2-S1  Motor:   L2 (hip flexion)  5/5   L3 (knee extension)  5/5   L4 (ankle dorsiflexion)  5/5   L5 (long toe extension) 5/5   S1 (ankle plantar flexion) 5/5      A/P: 68yFemale s/p  PSF L3-S1, L5-S1 TLIF with Dr. Schwab on 10/17.  - Stable  - Pain Control  - f/u AM labs  - DVT ppx: SCD, Lovenox POD1  - Post op abx: periop ancef  - PT, WBS: WBAT  - Monitor drain output  - Dispo: Pending PT

## 2023-10-19 NOTE — PROGRESS NOTE ADULT - SUBJECTIVE AND OBJECTIVE BOX
SUBJECTIVE: NAEON. Patient states pain is tolerable especially when she is in bed. Feels gas and bloating, has not had a BM yet but passed TOV. Itchiness is somewhat better with hydroxyzine but still present. NO other acute complaints - just has questions about dispo planning for home discharge.     MEDICATIONS:  MEDICATIONS  (STANDING):  acetaminophen     Tablet .. 1000 milliGRAM(s) Oral every 8 hours  chlorhexidine 2% Cloths 1 Application(s) Topical every 12 hours  dexAMETHasone  Injectable 6 milliGRAM(s) IV Push every 6 hours  DULoxetine 30 milliGRAM(s) Oral daily  enoxaparin Injectable 40 milliGRAM(s) SubCutaneous every 24 hours  lactated ringers. 1000 milliLiter(s) (100 mL/Hr) IV Continuous <Continuous>  melatonin 5 milliGRAM(s) Oral at bedtime  methocarbamol 500 milliGRAM(s) Oral every 8 hours  ondansetron   Disintegrating Tablet 4 milliGRAM(s) Oral every 6 hours  pantoprazole    Tablet 40 milliGRAM(s) Oral before breakfast  polyethylene glycol 3350 17 Gram(s) Oral at bedtime  senna 2 Tablet(s) Oral at bedtime    MEDICATIONS  (PRN):  aluminum hydroxide/magnesium hydroxide/simethicone Suspension 30 milliLiter(s) Oral every 12 hours PRN Indigestion  bisacodyl 5 milliGRAM(s) Oral every 12 hours PRN Constipation  HYDROmorphone  Injectable 0.5 milliGRAM(s) IV Push every 4 hours PRN breakthrough  HYDROmorphone  Injectable 0.5 milliGRAM(s) IV Push every 15 minutes PRN PACU  hydrOXYzine hydrochloride 25 milliGRAM(s) Oral every 6 hours PRN Itching  magnesium hydroxide Suspension 30 milliLiter(s) Oral every 12 hours PRN Constipation  oxyCODONE    IR 10 milliGRAM(s) Oral every 4 hours PRN Severe Pain (7 - 10)  oxyCODONE    IR 5 milliGRAM(s) Oral every 4 hours PRN Moderate Pain (4 - 6)  simethicone 80 milliGRAM(s) Chew every 6 hours PRN Gas      Allergies    sulfa drugs (Unknown)  codeine (Vomiting)  Lexapro (Swelling)  morphine (Vomiting)  Demerol HCl (Other)    Intolerances        OBJECTIVE:  Vital Signs Last 24 Hrs  T(C): 36.9 (19 Oct 2023 08:37), Max: 36.9 (19 Oct 2023 08:37)  T(F): 98.5 (19 Oct 2023 08:37), Max: 98.5 (19 Oct 2023 08:37)  HR: 81 (19 Oct 2023 08:37) (73 - 81)  BP: 135/66 (19 Oct 2023 08:37) (122/57 - 135/66)  BP(mean): --  RR: 17 (19 Oct 2023 08:37) (17 - 18)  SpO2: 100% (19 Oct 2023 08:37) (95% - 100%)    Parameters below as of 19 Oct 2023 08:37  Patient On (Oxygen Delivery Method): room air      I&O's Summary    18 Oct 2023 07:01  -  19 Oct 2023 07:00  --------------------------------------------------------  IN: 360 mL / OUT: 2055 mL / NET: -1695 mL    19 Oct 2023 07:01  -  19 Oct 2023 12:13  --------------------------------------------------------  IN: 360 mL / OUT: 560 mL / NET: -200 mL    PHYSICAL EXAM:  Gen: appears stated age, resting comfortably, NAD  HEENT: NCAT, MMM  Neck: supple  CV: RRR, no m/r/g, peripheral pulses 2+  Pulm: CTAB, no increased work of breathing, no rales/rhonchi  Abd: soft, ND, NT, no rebound or guarding  Skin: warm and dry, no rash  Ext: non-tender, no edema; back dressing CDI with drains in place  Neuro: AOx3, speaking in full sentences  Psych: affect and behavior appropriate, pleasant at time of interview  LABS:                        8.9    14.81 )-----------( 261      ( 19 Oct 2023 04:57 )             27.3     10-19    139  |  104  |  19  ----------------------------<  170<H>  5.2   |  27  |  0.83    Ca    9.2      19 Oct 2023 04:57          CAPILLARY BLOOD GLUCOSE        Urinalysis Basic - ( 19 Oct 2023 04:57 )    Color: x / Appearance: x / SG: x / pH: x  Gluc: 170 mg/dL / Ketone: x  / Bili: x / Urobili: x   Blood: x / Protein: x / Nitrite: x   Leuk Esterase: x / RBC: x / WBC x   Sq Epi: x / Non Sq Epi: x / Bacteria: x        MICRODATA:      RADIOLOGY/OTHER STUDIES:

## 2023-10-19 NOTE — PROGRESS NOTE ADULT - SUBJECTIVE AND OBJECTIVE BOX
Ortho Note    Pt comfortable without complaints, pain controlled  Denies CP, SOB, N/V, new numbness/tingling     Vital Signs Last 24 Hrs  T(C): 36.9 (10-19-23 @ 08:37), Max: 36.9 (10-19-23 @ 08:37)  T(F): 98.5 (10-19-23 @ 08:37), Max: 98.5 (10-19-23 @ 08:37)  HR: 81 (10-19-23 @ 08:37) (73 - 81)  BP: 135/66 (10-19-23 @ 08:37) (134/68 - 135/66)  BP(mean): --  RR: 17 (10-19-23 @ 08:37) (17 - 17)  SpO2: 100% (10-19-23 @ 08:37) (95% - 100%)  I&O's Summary    18 Oct 2023 07:01  -  19 Oct 2023 07:00  --------------------------------------------------------  IN: 360 mL / OUT: 2055 mL / NET: -1695 mL    19 Oct 2023 07:01  -  19 Oct 2023 10:58  --------------------------------------------------------  IN: 360 mL / OUT: 560 mL / NET: -200 mL        General: Pt Alert and oriented, NAD  DSG C/D/I-   Pulses:  Sensation:  Motor:   EHL/FHL/TA/GS                          8.9    14.81 )-----------( 261      ( 19 Oct 2023 04:57 )             27.3     10-19    139  |  104  |  19  ----------------------------<  170<H>  5.2   |  27  |  0.83    Ca    9.2      19 Oct 2023 04:57        A/P: 68yFemale s/p   - Stable  - Pain Control  - DVT ppx:  - PT, WBS:     Ortho Pager [phone] Ortho Note    Pt seen and examined at bedside. Pt reports feeling improved today but continues to report incisional low back pain radiating into her bilateral buttocks. Pt reports itching is minorly improved with atarax started for itching yesterday. Denies taking any medications that improve her itching. Tolerating PO diet. Urinating without difficulty. Passing flatus, denies BM yet.     Denies CP, SOB, N/V, new numbness/tingling, dizziness, palpitations    Vital Signs Last 24 Hrs  T(C): 36.9 (10-19-23 @ 08:37), Max: 36.9 (10-19-23 @ 08:37)  T(F): 98.5 (10-19-23 @ 08:37), Max: 98.5 (10-19-23 @ 08:37)  HR: 81 (10-19-23 @ 08:37) (73 - 81)  BP: 135/66 (10-19-23 @ 08:37) (134/68 - 135/66)  BP(mean): --  RR: 17 (10-19-23 @ 08:37) (17 - 17)  SpO2: 100% (10-19-23 @ 08:37) (95% - 100%)  I&O's Summary    18 Oct 2023 07:01  -  19 Oct 2023 07:00  --------------------------------------------------------  IN: 360 mL / OUT: 2055 mL / NET: -1695 mL    19 Oct 2023 07:01  -  19 Oct 2023 10:58  --------------------------------------------------------  IN: 360 mL / OUT: 560 mL / NET: -200 mL        General: Pt Alert and oriented, NAD  DSG C/D/I- telfa, tegaderm, HV x 2, BRANDON x 1  Pulses: 2+ DP bilateral lower extremities  Sensation: decreased sensation dorsum right foot and lateral aspect RLE compared to left (pts baseline); otherwise SILT distally bilateral lower extremities  Motor:   EHL/FHL/TA/GS: 5/5 bilaterally                          8.9    14.81 )-----------( 261      ( 19 Oct 2023 04:57 )             27.3     10-19    139  |  104  |  19  ----------------------------<  170<H>  5.2   |  27  |  0.83    Ca    9.2      19 Oct 2023 04:57        A/P: 68yFemale POD #2 s/p L3-S1 PSF, L5-S1 TLIF with Dr. Schwab  - Berkley, h/h: 8.9/27.3 today, will continue to monitor  - Pain Control  - Continue to monitor drain output  - Continue atarax- appreciate medicine recs  - DVT ppx: SCDs, lovenox 40mg subq daily  - PT, WBS: WBAT  - Dispo: Home pending drains    Ortho Pager [phone] Ortho Note    Pt seen and examined at bedside. Pt reports feeling improved today but continues to report incisional low back pain radiating into her bilateral buttocks. Pt reports itching is minorly improved with atarax started for itching yesterday. Denies taking any medications that improve her itching. Pt expressed concerns transferring to and from shower. Tolerating PO diet. Urinating without difficulty. Passing flatus, denies BM yet.     Denies CP, SOB, N/V, new numbness/tingling, dizziness, palpitations    Vital Signs Last 24 Hrs  T(C): 36.9 (10-19-23 @ 08:37), Max: 36.9 (10-19-23 @ 08:37)  T(F): 98.5 (10-19-23 @ 08:37), Max: 98.5 (10-19-23 @ 08:37)  HR: 81 (10-19-23 @ 08:37) (73 - 81)  BP: 135/66 (10-19-23 @ 08:37) (134/68 - 135/66)  BP(mean): --  RR: 17 (10-19-23 @ 08:37) (17 - 17)  SpO2: 100% (10-19-23 @ 08:37) (95% - 100%)  I&O's Summary    18 Oct 2023 07:01  -  19 Oct 2023 07:00  --------------------------------------------------------  IN: 360 mL / OUT: 2055 mL / NET: -1695 mL    19 Oct 2023 07:01  -  19 Oct 2023 10:58  --------------------------------------------------------  IN: 360 mL / OUT: 560 mL / NET: -200 mL        General: Pt Alert and oriented, NAD  DSG C/D/I- telfa, tegaderm, HV x 2, BRANDON x 1  Pulses: 2+ DP bilateral lower extremities  Sensation: decreased sensation dorsum right foot and lateral aspect RLE compared to left (pts baseline); otherwise SILT distally bilateral lower extremities  Motor:   EHL/FHL/TA/GS: 5/5 bilaterally                          8.9    14.81 )-----------( 261      ( 19 Oct 2023 04:57 )             27.3     10-19    139  |  104  |  19  ----------------------------<  170<H>  5.2   |  27  |  0.83    Ca    9.2      19 Oct 2023 04:57        A/P: 68yFemale POD #2 s/p L3-S1 PSF, L5-S1 TLIF with Dr. Schwab  - Berkley, h/h: 8.9/27.3 today, will continue to monitor  - Pain Control  - Continue to monitor drain output , afternoon drain check today  - Bowel regimen  - OOB to chair, encourage IS  - Continue atarax- appreciate medicine recs  - DVT ppx: SCDs, lovenox 40mg subq daily  - PT, WBS: WBAT, will add OT today to help with transfers  - Dispo: Home pending drains    Ortho Pager [phone] Ortho Note    Pt seen and examined at bedside. Pt reports feeling improved today but continues to report incisional low back pain radiating into her bilateral buttocks. Pt reports itching is minorly improved with atarax started for itching yesterday. Denies taking any medications that improve her itching. Pt expressed concerns transferring to and from shower. Tolerating PO diet. Urinating without difficulty. Passing flatus, denies BM yet.     Denies CP, SOB, N/V, new numbness/tingling, dizziness, palpitations    Vital Signs Last 24 Hrs  T(C): 36.9 (10-19-23 @ 08:37), Max: 36.9 (10-19-23 @ 08:37)  T(F): 98.5 (10-19-23 @ 08:37), Max: 98.5 (10-19-23 @ 08:37)  HR: 81 (10-19-23 @ 08:37) (73 - 81)  BP: 135/66 (10-19-23 @ 08:37) (134/68 - 135/66)  BP(mean): --  RR: 17 (10-19-23 @ 08:37) (17 - 17)  SpO2: 100% (10-19-23 @ 08:37) (95% - 100%)  I&O's Summary    18 Oct 2023 07:01  -  19 Oct 2023 07:00  --------------------------------------------------------  IN: 360 mL / OUT: 2055 mL / NET: -1695 mL    19 Oct 2023 07:01  -  19 Oct 2023 10:58  --------------------------------------------------------  IN: 360 mL / OUT: 560 mL / NET: -200 mL        General: Pt Alert and oriented, NAD  DSG C/D/I- telfa, tegaderm, HV x 2, BRANDON x 1  Pulses: 2+ DP bilateral lower extremities  Sensation: decreased sensation dorsum right foot and lateral aspect RLE compared to left (pts baseline); otherwise SILT distally bilateral lower extremities  Motor:   EHL/FHL/TA/GS: 5/5 bilaterally                          8.9    14.81 )-----------( 261      ( 19 Oct 2023 04:57 )             27.3     10-19    139  |  104  |  19  ----------------------------<  170<H>  5.2   |  27  |  0.83    Ca    9.2      19 Oct 2023 04:57        A/P: 68yFemale POD #2 s/p L3-S1 PSF, L5-S1 TLIF with Dr. Schwab  - [place]kley, h/h: 8.9/27.3 today, will continue to monitor  - Pain Control  - Continue to monitor drain output , afternoon drain check today  - Bowel regimen  - OOB to chair, encourage IS  - Continue atarax- appreciate medicine recs  - Start simethicone for gas as needed  - DVT ppx: SCDs, lovenox 40mg subq daily  - PT, WBS: WBAT, will add OT today to help with transfers  - Dispo: Home pending drains    Ortho Pager [phone]

## 2023-10-19 NOTE — PROGRESS NOTE ADULT - ASSESSMENT
68 YOF with PMH Of mood disorder, osteoporosis, lumbar stenosis s/p multiple back surgeries admitted for elective PSF L3-S1, L5-S1 TLIF s/p OR with Dr. Schwab on 10/17.    Chronic back pain  Lumbar stenosis s/p PSF L3-S1, L5-S1 TLIF  - management per ortho, s/p OR with Dr. Schwab on 10/17, drains in place, on steroids  - pain control with bowel regimen, frequent perioperative incentive spirometer use  - chemical DVT ppx with LMWH   - simethicone PRN for gas discmofort    Leukocytosis  - afebrile, no s/s infection  - likely reactive 2/2 surgery and steroids  - monitor off antibiotics    Acute blood loss anemia  - Hgb 12.7 --> 8.9, HDS  - asymptomatic w/o active s/s bleeding  - likely partially 2/2 operative losses    Dehydration, improved  - s/p 1L IVF bolus 10/18  - orthostatics neg  - encourage PO hydration     Generalized pruritis  - no rash, no SOB, no GI sx  - no improvement with diphenhydramine  - mild improvement with hydroxyzine - can increase to 25mg q6hr PRN  - monitor Qtc with daily ECG     Mood disorder  - cont home duloxetine 30mg    Dispo: pending drain removal, home with HPT (likely Sunday)    Plan discussed with primary team.

## 2023-10-20 LAB
ANION GAP SERPL CALC-SCNC: 8 MMOL/L — SIGNIFICANT CHANGE UP (ref 5–17)
ANION GAP SERPL CALC-SCNC: 8 MMOL/L — SIGNIFICANT CHANGE UP (ref 5–17)
BASOPHILS # BLD AUTO: 0.01 K/UL — SIGNIFICANT CHANGE UP (ref 0–0.2)
BASOPHILS # BLD AUTO: 0.01 K/UL — SIGNIFICANT CHANGE UP (ref 0–0.2)
BASOPHILS NFR BLD AUTO: 0.1 % — SIGNIFICANT CHANGE UP (ref 0–2)
BASOPHILS NFR BLD AUTO: 0.1 % — SIGNIFICANT CHANGE UP (ref 0–2)
BUN SERPL-MCNC: 19 MG/DL — SIGNIFICANT CHANGE UP (ref 7–23)
BUN SERPL-MCNC: 19 MG/DL — SIGNIFICANT CHANGE UP (ref 7–23)
CALCIUM SERPL-MCNC: 8.8 MG/DL — SIGNIFICANT CHANGE UP (ref 8.4–10.5)
CALCIUM SERPL-MCNC: 8.8 MG/DL — SIGNIFICANT CHANGE UP (ref 8.4–10.5)
CHLORIDE SERPL-SCNC: 102 MMOL/L — SIGNIFICANT CHANGE UP (ref 96–108)
CHLORIDE SERPL-SCNC: 102 MMOL/L — SIGNIFICANT CHANGE UP (ref 96–108)
CO2 SERPL-SCNC: 28 MMOL/L — SIGNIFICANT CHANGE UP (ref 22–31)
CO2 SERPL-SCNC: 28 MMOL/L — SIGNIFICANT CHANGE UP (ref 22–31)
CREAT SERPL-MCNC: 0.8 MG/DL — SIGNIFICANT CHANGE UP (ref 0.5–1.3)
CREAT SERPL-MCNC: 0.8 MG/DL — SIGNIFICANT CHANGE UP (ref 0.5–1.3)
EGFR: 80 ML/MIN/1.73M2 — SIGNIFICANT CHANGE UP
EGFR: 80 ML/MIN/1.73M2 — SIGNIFICANT CHANGE UP
EOSINOPHIL # BLD AUTO: 0.01 K/UL — SIGNIFICANT CHANGE UP (ref 0–0.5)
EOSINOPHIL # BLD AUTO: 0.01 K/UL — SIGNIFICANT CHANGE UP (ref 0–0.5)
EOSINOPHIL NFR BLD AUTO: 0.1 % — SIGNIFICANT CHANGE UP (ref 0–6)
EOSINOPHIL NFR BLD AUTO: 0.1 % — SIGNIFICANT CHANGE UP (ref 0–6)
GLUCOSE SERPL-MCNC: 118 MG/DL — HIGH (ref 70–99)
GLUCOSE SERPL-MCNC: 118 MG/DL — HIGH (ref 70–99)
HCT VFR BLD CALC: 24.6 % — LOW (ref 34.5–45)
HCT VFR BLD CALC: 24.6 % — LOW (ref 34.5–45)
HGB BLD-MCNC: 8 G/DL — LOW (ref 11.5–15.5)
HGB BLD-MCNC: 8 G/DL — LOW (ref 11.5–15.5)
IMM GRANULOCYTES NFR BLD AUTO: 0.6 % — SIGNIFICANT CHANGE UP (ref 0–0.9)
IMM GRANULOCYTES NFR BLD AUTO: 0.6 % — SIGNIFICANT CHANGE UP (ref 0–0.9)
LYMPHOCYTES # BLD AUTO: 1.23 K/UL — SIGNIFICANT CHANGE UP (ref 1–3.3)
LYMPHOCYTES # BLD AUTO: 1.23 K/UL — SIGNIFICANT CHANGE UP (ref 1–3.3)
LYMPHOCYTES # BLD AUTO: 12.6 % — LOW (ref 13–44)
LYMPHOCYTES # BLD AUTO: 12.6 % — LOW (ref 13–44)
MCHC RBC-ENTMCNC: 30 PG — SIGNIFICANT CHANGE UP (ref 27–34)
MCHC RBC-ENTMCNC: 30 PG — SIGNIFICANT CHANGE UP (ref 27–34)
MCHC RBC-ENTMCNC: 32.5 GM/DL — SIGNIFICANT CHANGE UP (ref 32–36)
MCHC RBC-ENTMCNC: 32.5 GM/DL — SIGNIFICANT CHANGE UP (ref 32–36)
MCV RBC AUTO: 92.1 FL — SIGNIFICANT CHANGE UP (ref 80–100)
MCV RBC AUTO: 92.1 FL — SIGNIFICANT CHANGE UP (ref 80–100)
MONOCYTES # BLD AUTO: 1.3 K/UL — HIGH (ref 0–0.9)
MONOCYTES # BLD AUTO: 1.3 K/UL — HIGH (ref 0–0.9)
MONOCYTES NFR BLD AUTO: 13.3 % — SIGNIFICANT CHANGE UP (ref 2–14)
MONOCYTES NFR BLD AUTO: 13.3 % — SIGNIFICANT CHANGE UP (ref 2–14)
NEUTROPHILS # BLD AUTO: 7.13 K/UL — SIGNIFICANT CHANGE UP (ref 1.8–7.4)
NEUTROPHILS # BLD AUTO: 7.13 K/UL — SIGNIFICANT CHANGE UP (ref 1.8–7.4)
NEUTROPHILS NFR BLD AUTO: 73.3 % — SIGNIFICANT CHANGE UP (ref 43–77)
NEUTROPHILS NFR BLD AUTO: 73.3 % — SIGNIFICANT CHANGE UP (ref 43–77)
NRBC # BLD: 0 /100 WBCS — SIGNIFICANT CHANGE UP (ref 0–0)
NRBC # BLD: 0 /100 WBCS — SIGNIFICANT CHANGE UP (ref 0–0)
PLATELET # BLD AUTO: 245 K/UL — SIGNIFICANT CHANGE UP (ref 150–400)
PLATELET # BLD AUTO: 245 K/UL — SIGNIFICANT CHANGE UP (ref 150–400)
POTASSIUM SERPL-MCNC: 4.4 MMOL/L — SIGNIFICANT CHANGE UP (ref 3.5–5.3)
POTASSIUM SERPL-MCNC: 4.4 MMOL/L — SIGNIFICANT CHANGE UP (ref 3.5–5.3)
POTASSIUM SERPL-SCNC: 4.4 MMOL/L — SIGNIFICANT CHANGE UP (ref 3.5–5.3)
POTASSIUM SERPL-SCNC: 4.4 MMOL/L — SIGNIFICANT CHANGE UP (ref 3.5–5.3)
RBC # BLD: 2.67 M/UL — LOW (ref 3.8–5.2)
RBC # BLD: 2.67 M/UL — LOW (ref 3.8–5.2)
RBC # FLD: 12.4 % — SIGNIFICANT CHANGE UP (ref 10.3–14.5)
RBC # FLD: 12.4 % — SIGNIFICANT CHANGE UP (ref 10.3–14.5)
SODIUM SERPL-SCNC: 138 MMOL/L — SIGNIFICANT CHANGE UP (ref 135–145)
SODIUM SERPL-SCNC: 138 MMOL/L — SIGNIFICANT CHANGE UP (ref 135–145)
WBC # BLD: 9.74 K/UL — SIGNIFICANT CHANGE UP (ref 3.8–10.5)
WBC # BLD: 9.74 K/UL — SIGNIFICANT CHANGE UP (ref 3.8–10.5)
WBC # FLD AUTO: 9.74 K/UL — SIGNIFICANT CHANGE UP (ref 3.8–10.5)
WBC # FLD AUTO: 9.74 K/UL — SIGNIFICANT CHANGE UP (ref 3.8–10.5)

## 2023-10-20 PROCEDURE — 99232 SBSQ HOSP IP/OBS MODERATE 35: CPT

## 2023-10-20 RX ORDER — TIZANIDINE 4 MG/1
2 TABLET ORAL
Refills: 0 | Status: DISCONTINUED | OUTPATIENT
Start: 2023-10-20 | End: 2023-10-22

## 2023-10-20 RX ORDER — LORATADINE 10 MG/1
10 TABLET ORAL DAILY
Refills: 0 | Status: DISCONTINUED | OUTPATIENT
Start: 2023-10-20 | End: 2023-10-22

## 2023-10-20 RX ADMIN — Medication 5 MILLIGRAM(S): at 21:50

## 2023-10-20 RX ADMIN — OXYCODONE HYDROCHLORIDE 10 MILLIGRAM(S): 5 TABLET ORAL at 19:50

## 2023-10-20 RX ADMIN — Medication 1000 MILLIGRAM(S): at 05:26

## 2023-10-20 RX ADMIN — OXYCODONE HYDROCHLORIDE 10 MILLIGRAM(S): 5 TABLET ORAL at 12:28

## 2023-10-20 RX ADMIN — ENOXAPARIN SODIUM 40 MILLIGRAM(S): 100 INJECTION SUBCUTANEOUS at 21:50

## 2023-10-20 RX ADMIN — Medication 1000 MILLIGRAM(S): at 21:50

## 2023-10-20 RX ADMIN — ONDANSETRON 4 MILLIGRAM(S): 8 TABLET, FILM COATED ORAL at 12:28

## 2023-10-20 RX ADMIN — TIZANIDINE 2 MILLIGRAM(S): 4 TABLET ORAL at 05:25

## 2023-10-20 RX ADMIN — PANTOPRAZOLE SODIUM 40 MILLIGRAM(S): 20 TABLET, DELAYED RELEASE ORAL at 05:26

## 2023-10-20 RX ADMIN — Medication 1000 MILLIGRAM(S): at 05:38

## 2023-10-20 RX ADMIN — OXYCODONE HYDROCHLORIDE 10 MILLIGRAM(S): 5 TABLET ORAL at 18:50

## 2023-10-20 RX ADMIN — Medication 1000 MILLIGRAM(S): at 14:36

## 2023-10-20 RX ADMIN — Medication 25 MILLIGRAM(S): at 05:27

## 2023-10-20 RX ADMIN — POLYETHYLENE GLYCOL 3350 17 GRAM(S): 17 POWDER, FOR SOLUTION ORAL at 21:51

## 2023-10-20 RX ADMIN — OXYCODONE HYDROCHLORIDE 10 MILLIGRAM(S): 5 TABLET ORAL at 13:28

## 2023-10-20 RX ADMIN — DULOXETINE HYDROCHLORIDE 30 MILLIGRAM(S): 30 CAPSULE, DELAYED RELEASE ORAL at 21:50

## 2023-10-20 RX ADMIN — OXYCODONE HYDROCHLORIDE 10 MILLIGRAM(S): 5 TABLET ORAL at 05:26

## 2023-10-20 RX ADMIN — OXYCODONE HYDROCHLORIDE 10 MILLIGRAM(S): 5 TABLET ORAL at 06:23

## 2023-10-20 RX ADMIN — MAGNESIUM HYDROXIDE 30 MILLILITER(S): 400 TABLET, CHEWABLE ORAL at 12:28

## 2023-10-20 RX ADMIN — ONDANSETRON 4 MILLIGRAM(S): 8 TABLET, FILM COATED ORAL at 18:50

## 2023-10-20 RX ADMIN — SENNA PLUS 2 TABLET(S): 8.6 TABLET ORAL at 21:50

## 2023-10-20 RX ADMIN — LORATADINE 10 MILLIGRAM(S): 10 TABLET ORAL at 14:36

## 2023-10-20 NOTE — OCCUPATIONAL THERAPY INITIAL EVALUATION ADULT - MD ORDER
Severe Back and leg pain   Lumbar spinal stenosis  Now s/p Fusion, spine, lumbar, TLIF, 1-2 levels (L3-S1 PSF, L5-S1 TLIF) on 10/17/23

## 2023-10-20 NOTE — PROGRESS NOTE ADULT - SUBJECTIVE AND OBJECTIVE BOX
Ortho Note    Subjective:  Pt comfortable without complaints, pain controlled  Denies CP, SOB, N/V, numbness/tingling     Vital Signs Last 24 Hrs  T(C): 36.8 (10-20-23 @ 05:05), Max: 36.8 (10-20-23 @ 05:05)  T(F): 98.2 (10-20-23 @ 05:05), Max: 98.2 (10-20-23 @ 05:05)  HR: 78 (10-20-23 @ 05:05) (78 - 78)  BP: 100/61 (10-20-23 @ 05:05) (100/61 - 100/61)  BP(mean): --  RR: 18 (10-20-23 @ 05:05) (18 - 18)  SpO2: 96% (10-20-23 @ 05:05) (96% - 96%)  AVSS    Objective:    Physical Exam:  General: Pt Alert and oriented, NAD  DSG C/D/I  Pulses:  Sensation:  Motor: EHL/FHL/TA/GS        Plan of Care:  A/P: 68yFemale POD# s/p   - Stable  - Pain Control  - DVT ppx:  - PT, WBS:     Ortho Pager [phone]

## 2023-10-20 NOTE — CONSULT NOTE ADULT - SUBJECTIVE AND OBJECTIVE BOX
HPI: 68 YOF with PMH Of mood disorder, osteoporosis, lumbar stenosis s/p multiple back surgeries admitted for elective PSF L3-S1, L5-S1 TLIF s/p OR with Dr. Schwab on 10/17. [place]. Patient states the pain medications cause generalized itchiness - no rash. She also feels pain in a band around her lower back that radiated to her buttocks with associated paresthesias. Last BM yesterday before surgery. Rothman removed shortly prior to interview. Denies fever, vision changes, hearing changes, rhinorrhea, sore throat, chest pain, palpitations, cough, SOB, abd pain, N/V/D, dysuria, hematuria, HA, LOC.    ROS: negative except as per HPI    PMH: as per HPI  PSH: as per HPI  Medications: duloxetine 30mg  Allergies: reviewed  SH:  FH:    Diet, Regular:   Kosher (10-18-23 @ 07:19) [Active]      MEDICATIONS:  MEDICATIONS  (STANDING):  acetaminophen     Tablet .. 1000 milliGRAM(s) Oral every 8 hours  chlorhexidine 2% Cloths 1 Application(s) Topical every 12 hours  dexAMETHasone  Injectable 6 milliGRAM(s) IV Push every 6 hours  DULoxetine 30 milliGRAM(s) Oral daily  enoxaparin Injectable 40 milliGRAM(s) SubCutaneous every 24 hours  lactated ringers. 1000 milliLiter(s) (100 mL/Hr) IV Continuous <Continuous>  methocarbamol 500 milliGRAM(s) Oral every 8 hours  ondansetron   Disintegrating Tablet 4 milliGRAM(s) Oral every 6 hours  pantoprazole    Tablet 40 milliGRAM(s) Oral before breakfast  polyethylene glycol 3350 17 Gram(s) Oral at bedtime  senna 2 Tablet(s) Oral at bedtime    MEDICATIONS  (PRN):  aluminum hydroxide/magnesium hydroxide/simethicone Suspension 30 milliLiter(s) Oral every 12 hours PRN Indigestion  bisacodyl 5 milliGRAM(s) Oral every 12 hours PRN Constipation  HYDROmorphone  Injectable 0.5 milliGRAM(s) IV Push every 15 minutes PRN PACU  HYDROmorphone  Injectable 0.5 milliGRAM(s) IV Push every 4 hours PRN breakthrough  hydrOXYzine hydrochloride 25 milliGRAM(s) Oral every 6 hours PRN Itching  magnesium hydroxide Suspension 30 milliLiter(s) Oral every 12 hours PRN Constipation  oxyCODONE    IR 10 milliGRAM(s) Oral every 4 hours PRN Severe Pain (7 - 10)  oxyCODONE    IR 5 milliGRAM(s) Oral every 4 hours PRN Moderate Pain (4 - 6)      Allergies    sulfa drugs (Unknown)  codeine (Vomiting)  Lexapro (Swelling)  morphine (Vomiting)  Demerol HCl (Other)    Intolerances        OBJECTIVE:  Vital Signs Last 24 Hrs  T(C): 37.3 (18 Oct 2023 09:02), Max: 37.3 (18 Oct 2023 09:02)  T(F): 99.2 (18 Oct 2023 09:02), Max: 99.2 (18 Oct 2023 09:02)  HR: 77 (18 Oct 2023 09:02) (60 - 79)  BP: 115/61 (18 Oct 2023 09:02) (95/52 - 164/72)  BP(mean): 90 (17 Oct 2023 19:30) (71 - 90)  RR: 17 (18 Oct 2023 09:02) (12 - 19)  SpO2: 96% (18 Oct 2023 09:02) (94% - 100%)    Parameters below as of 18 Oct 2023 09:02  Patient On (Oxygen Delivery Method): room air      I&O's Summary    17 Oct 2023 07:01  -  18 Oct 2023 07:00  --------------------------------------------------------  IN: 250 mL / OUT: 878 mL / NET: -628 mL    18 Oct 2023 07:01  -  18 Oct 2023 13:12  --------------------------------------------------------  IN: 360 mL / OUT: 1365 mL / NET: -1005 mL        PHYSICAL EXAM:  Gen: appears stated age, resting comfortably, NAD  HEENT: NCAT, DMM  Neck: supple  CV: RRR, no m/r/g, peripheral pulses 2+  Pulm: CTAB, no increased work of breathing, no rales/rhonchi  Abd: soft, ND, NT, no rebound or guarding  Skin: warm and dry  Ext: non-tender, no edema; back dressing CDI with two drains  Neuro: AOx3, speaking in full sentences  Psych: affect and behavior appropriate, pleasant at time of interview    LABS:  No labs available     CAPILLARY BLOOD GLUCOSE      POCT Blood Glucose.: 120 mg/dL (17 Oct 2023 18:00)        MICRODATA:      RADIOLOGY/OTHER STUDIES:
ORTHO PAIN MANAGEMENT CONSULT NOTE    Chief Complaint: back pain    HPI:  68F c/o lower back pain x several years. She has had 4 previous spine surgeries and multiple other surgeries. She comes for surgery today because of continued pain in the back that radiates down the legs, L>R. Pain radiates down the lateral left leg and into the toes. She also notes pain at the left lateral hip region. She notes history of neuropathy in her bilateral feet.     Denies PMHx of DVT/PE, MI, stroke, DM2.    Presents today for a posterior spinal decompression fusion L3-S1, TLIF L5-S1, minimally invasive decompression L1-L2.    Patient cannot take Tramadol as she develops extreme pruritus. She has taken Oxycodone in the past without negative side effects. She has not tried Dilaudid. She believes she once received a dose of Morphine in the ED without negative side effects. She has taken Tizanadine without side effects.   (16 Oct 2023 11:53)    This morning patient received oxycodone, tizanidine and atarax at the same time and was transiently confused and lethargic. Currently, she complains of intermittent pain that worsens with any movement. She describes the pain as "raw" in nature. She also continues to complain of itiching that was noted to be worse after IV dilaudid. We discussed stopping dilaudid and avoiding additional drugs that may cause pruritis. Patient says she was on gabapentin at home but it was not helpful. Patient also reports having suicidal ideation after taking lyrica in the past.     PAST MEDICAL & SURGICAL HISTORY:  Lumbar Disc Disorder      Hematuria, Microscopic      Endometrial Hyperplasia      Cervical disc disease      Vitamin D deficiency      Neuropathy  2003 - lower extremities      Sinusitis  last 3 weeks ago      Osteoporosis      H/O neuropathy      Vertigo      Anxiety      S/P Laminectomy  L-5   L4 - L5-   L2-5, S1- 2003      S/P Laminectomy  C4-7      S/P Carpal Tunnel Release, right & trigger release right index finger      Trigger Finger Release  right middle      S/P Carpal Tunnel Release, trigger finger release, middle finger, Left      Cyst of Finger, Left thumb, tendon release  Excision      S/P Abdominoplasty      History of Tonsillectomy      D & C       delivery delivered        Uterine hypertrophy  D and C -       History of knee sprain  knee arthroscopy       H/O parathyroidectomy        S/P bilateral breast reduction            FAMILY HISTORY:  FH: type 2 diabetes (Father)    FH: lymphoma (Mother)        SOCIAL HISTORY:  [ ] Denies Smoking, Alcohol, or Drug Use    HOME MEDICATIONS:   Please refer to initial HNP    PAIN HOME MEDICATIONS:    Allergies    sulfa drugs (Unknown)  codeine (Vomiting)  Lexapro (Swelling)  morphine (Vomiting)  Demerol HCl (Other)    Intolerances        PAIN MEDICATIONS:  acetaminophen     Tablet .. 1000 milliGRAM(s) Oral every 8 hours  DULoxetine 30 milliGRAM(s) Oral daily  melatonin 5 milliGRAM(s) Oral at bedtime  ondansetron   Disintegrating Tablet 4 milliGRAM(s) Oral every 6 hours  oxyCODONE    IR 10 milliGRAM(s) Oral every 4 hours PRN  oxyCODONE    IR 5 milliGRAM(s) Oral every 4 hours PRN  tiZANidine 2 milliGRAM(s) Oral <User Schedule> PRN    Heme:  enoxaparin Injectable 40 milliGRAM(s) SubCutaneous every 24 hours    Antibiotics:    Cardiovascular:    GI:  aluminum hydroxide/magnesium hydroxide/simethicone Suspension 30 milliLiter(s) Oral every 12 hours PRN  bisacodyl 5 milliGRAM(s) Oral every 12 hours PRN  bisacodyl Suppository 10 milliGRAM(s) Rectal daily PRN  magnesium hydroxide Suspension 30 milliLiter(s) Oral every 12 hours PRN  pantoprazole    Tablet 40 milliGRAM(s) Oral before breakfast  polyethylene glycol 3350 17 Gram(s) Oral at bedtime  senna 2 Tablet(s) Oral at bedtime  simethicone 80 milliGRAM(s) Chew every 6 hours PRN    Endocrine:  dexAMETHasone  Injectable 6 milliGRAM(s) IV Push every 6 hours    All Other Medications:  chlorhexidine 2% Cloths 1 Application(s) Topical every 12 hours  lactated ringers. 1000 milliLiter(s) IV Continuous <Continuous>      Vital Signs Last 24 Hrs  T(C): 36.6 (20 Oct 2023 08:28), Max: 37 (20 Oct 2023 00:26)  T(F): 97.8 (20 Oct 2023 08:28), Max: 98.6 (20 Oct 2023 00:26)  HR: 63 (20 Oct 2023 08:28) (63 - 85)  BP: 95/58 (20 Oct 2023 08:28) (95/58 - 129/67)  BP(mean): --  RR: 17 (20 Oct 2023 08:28) (17 - 18)  SpO2: 95% (20 Oct 2023 08:28) (95% - 100%)    Parameters below as of 20 Oct 2023 08:28  Patient On (Oxygen Delivery Method): room air        LABS:                        8.0    9.74  )-----------( 245      ( 20 Oct 2023 06:27 )             24.6     10-20    138  |  102  |  19  ----------------------------<  118<H>  4.4   |  28  |  0.80    Ca    8.8      20 Oct 2023 06:27        REVIEW OF SYSTEMS:  CONSTITUTIONAL: No fever or fatigue O/N.   EYES: No eye pain, visual disturbances  ENMT:  No difficulty hearing. No throat pain  NECK: No pain or stiffness  RESPIRATORY: No cough, wheezing; No shortness of breath  CARDIOVASCULAR: No chest pain, palpitations.   GASTROINTESTINAL: Pt reports passing gas. No bowel movements. No abdominal or epigastric pain. No nausea, vomiting. GENITOURINARY: No dysuria, frequency, or incontinence  NEUROLOGICAL: No headaches, loss of strength, numbness, or tremors. No dizzinesss or lightheadedness with pain medications.   MUSCULOSKELETAL: Incisional back pain. No joint pain or swelling; No muscle, or extremity pain      PHYSICAL EXAM  GENERAL: Laying in bed, NAD  Neuro: CN II-XII PERRRL, EOMI  Cranial nerves grossly intact  Motor exam: 5/5 b/l UE and LEs  Sensation intact to LT in UE/LE in 3 dermatomes  CHEST/LUNG: Clear to auscultation bilaterally; No rales, rhonchi, wheezing, or rubs  HEART: Regular rate and rhythm; No murmurs, rubs, or gallops  ABDOMEN: Soft, Nontender, Nondistended; Bowel sounds present  EXTREMITIES:  2+ Peripheral Pulses, No clubbing, cyanosis, or edema  SKIN: No rashes or lesions      ASSESSMENT:    68yFemale s/p  PSF L3-S1, L5-S1 TLIF with Dr. Schwab on 10/17    PLAN:   - Pain:  >acetaminophen 1000mg P Oq8h  >tizanidine 2mg PO q12h prn for muscle spasms  >oxycodone 5mg to 10 mg for moderate to severe pain  >discontinue IV dilaudid     - Bowel regimen: Senna    - Nausea ppx: Zofran standing  - Functional Goals: Pt will get OOB with PT today. Pt will resume previous level of activity without impairment from surgery.     - Follow up, Discharge Planning:     Patient is set for discharge to:     Discharge is pending:   - Pain Management follow up plan: Dr Schafer and team will continue to follow while inpatient      Discussed plan with Dr. Schafer

## 2023-10-20 NOTE — PROGRESS NOTE ADULT - SUBJECTIVE AND OBJECTIVE BOX
Ortho Note    Pt seen and examined at bedside. Pt reports feeling improved today but continues to report incisional low back pain radiating into her bilateral buttocks. Pt reports itching is minorly improved with atarax started for itching yesterday. Denies taking any medications that improve her itching. Pt expressed concerns transferring to and from shower. Tolerating PO diet. Urinating without difficulty. Passing flatus, denies BM yet.     Denies CP, SOB, N/V, new numbness/tingling, dizziness, palpitations    Vital Signs Last 24 Hrs  T(C): 36.9 (10-19-23 @ 08:37), Max: 36.9 (10-19-23 @ 08:37)  T(F): 98.5 (10-19-23 @ 08:37), Max: 98.5 (10-19-23 @ 08:37)  HR: 81 (10-19-23 @ 08:37) (73 - 81)  BP: 135/66 (10-19-23 @ 08:37) (134/68 - 135/66)  BP(mean): --  RR: 17 (10-19-23 @ 08:37) (17 - 17)  SpO2: 100% (10-19-23 @ 08:37) (95% - 100%)  I&O's Summary    18 Oct 2023 07:01  -  19 Oct 2023 07:00  --------------------------------------------------------  IN: 360 mL / OUT: 2055 mL / NET: -1695 mL    19 Oct 2023 07:01  -  19 Oct 2023 10:58  --------------------------------------------------------  IN: 360 mL / OUT: 560 mL / NET: -200 mL        General: Pt Alert and oriented, NAD  DSG C/D/I- telfa, tegaderm, HV x 2, BRANDON x 1  Pulses: 2+ DP bilateral lower extremities  Sensation: decreased sensation dorsum right foot and lateral aspect RLE compared to left (pts baseline); otherwise SILT distally bilateral lower extremities  Motor:   EHL/FHL/TA/GS: 5/5 bilaterally                          8.9    14.81 )-----------( 261      ( 19 Oct 2023 04:57 )             27.3     10-19    139  |  104  |  19  ----------------------------<  170<H>  5.2   |  27  |  0.83    Ca    9.2      19 Oct 2023 04:57        A/P: 68yFemale POD #2 s/p L3-S1 PSF, L5-S1 TLIF with Dr. Schwab  - Berkley, h/h: 8.9/27.3 today, will continue to monitor  - Pain Control  - Continue to monitor drain output , afternoon drain check today  - Bowel regimen  - OOB to chair, encourage IS  - Continue atarax- appreciate medicine recs  - Start simethicone for gas as needed  - DVT ppx: SCDs, lovenox 40mg subq daily  - PT, WBS: WBAT, will add OT today to help with transfers  - Dispo: Home pending drains    Ortho Pager [phone] Ortho Note    Pt seen and examined at bedside. Pt continues to report incisional low back pain she describes as "raw." Pt states she had gas yesterday, simethicone was ordered, however patient states she forgot to ask for it, however denies gas pain today. Pt noted itching after taking robaxin yesterday afternoon, it was discontinued and tizanidine was started as pt states she has tolerated this without adverse effects. Pain management consult today. Denies itching this am. Tolerating PO diet. Urinating without difficulty. Passing flatus, denies BM yet.     Denies CP, SOB, N/V, new numbness/tingling, dizziness, palpitations    Vital Signs Last 24 Hrs  T(C): 36.9 (10-19-23 @ 08:37), Max: 36.9 (10-19-23 @ 08:37)  T(F): 98.5 (10-19-23 @ 08:37), Max: 98.5 (10-19-23 @ 08:37)  HR: 81 (10-19-23 @ 08:37) (73 - 81)  BP: 135/66 (10-19-23 @ 08:37) (134/68 - 135/66)  BP(mean): --  RR: 17 (10-19-23 @ 08:37) (17 - 17)  SpO2: 100% (10-19-23 @ 08:37) (95% - 100%)  I&O's Summary    18 Oct 2023 07:01  -  19 Oct 2023 07:00  --------------------------------------------------------  IN: 360 mL / OUT: 2055 mL / NET: -1695 mL    19 Oct 2023 07:01  -  19 Oct 2023 10:58  --------------------------------------------------------  IN: 360 mL / OUT: 560 mL / NET: -200 mL        General: Pt Alert and oriented, NAD  DSG C/D/I- telfa, tegaderm, HV x 1, BRANDON x 1, L HV discontinued  Pulses: 2+ DP bilateral lower extremities  Sensation: decreased sensation dorsum right foot and lateral aspect RLE compared to left (pts baseline); otherwise SILT distally bilateral lower extremities  Motor:   EHL/FHL/TA/GS: 5/5 bilaterally                          8.9    14.81 )-----------( 261      ( 19 Oct 2023 04:57 )             27.3     10-19    139  |  104  |  19  ----------------------------<  170<H>  5.2   |  27  |  0.83    Ca    9.2      19 Oct 2023 04:57        A/P: 68yFemale POD #3 s/p L3-S1 PSF, L5-S1 TLIF with Dr. Schwab  - Berkley, h/h: 8.0/24.6 today, will continue to monitor  - Pain Control  - Left HV pulled without complication. Drain site covered with gauze and tegaderm, aquacel dressing replaced. Pt tolerated drain removal and dressing change. Continue to monitor remaining drain outputs, afternoon drain check today  - Pain management consult today- recs appreciated  - Bowel regimen- increase bowel regimen today  - OOB to chair, encourage IS  - Atarax discontinued, per medicine will start loratidine 10mg oral daily  - Continue simethicone 80mg q6hrs oral for gas as needed  - DVT ppx: SCDs, lovenox 40mg subq daily  - PT, WBS: WBAT  - Dispo: Home pending drains    Ortho Pager [phone]

## 2023-10-20 NOTE — OCCUPATIONAL THERAPY INITIAL EVALUATION ADULT - PERTINENT HX OF CURRENT PROBLEM, REHAB EVAL
68F c/o lower back pain x several years. She has had 4 previous spine surgeries and multiple other surgeries. She comes for surgery today because of continued pain in the back that radiates down the legs, L>R. Pain radiates down the lateral left leg and into the toes. She also notes pain at the left lateral hip region. She notes history of neuropathy in her bilateral feet. Pt presents today for a posterior spinal decompression fusion L3-S1, TLIF L5-S1, minimally invasive decompression L1-L2.

## 2023-10-20 NOTE — OCCUPATIONAL THERAPY INITIAL EVALUATION ADULT - ADDITIONAL COMMENTS
Pt lives w/ her  in [facility]. Pt states that she was independent prior to admission and w/o recent use of AEs/DMEs. Pt states that she has RW, shower chair and commode now ordered.

## 2023-10-20 NOTE — PROGRESS NOTE ADULT - ASSESSMENT
68 YOF with PMH Of mood disorder, osteoporosis, lumbar stenosis s/p multiple back surgeries admitted for elective PSF L3-S1, L5-S1 TLIF s/p OR with Dr. Schwab on 10/17.    Chronic back pain  Lumbar stenosis s/p PSF L3-S1, L5-S1 TLIF  - management per ortho, s/p OR with Dr. Schwab on 10/17, drains in place, on steroids  - pain control with bowel regimen, frequent perioperative incentive spirometer use  - chemical DVT ppx with LMWH   - escalate bowel regimen - recommend Miralax BID and lactulose today     Leukocytosis, resolved  - afebrile, no s/s infection  - likely reactive 2/2 surgery and steroids  - monitor off antibiotics    Acute blood loss anemia  - Hgb 12.7 --> 8.9 --> 8.0  - HDS w/o active s/s bleeding  - likely partially 2/2 operative losses  - consider transfusion of pRBC if symptomatic (has not been dizzy/orthostatic with PT thus far)    Generalized pruritis  - adverse reaction to opioid pain medications  - no rash, no SOB, no GI sx  - no improvement with diphenhydramine, minimal with hydroxyzine  - trial loratadine 10mg daily     Mood disorder  - cont home duloxetine 30mg    Dispo: pending drain removal, home with HPT (likely Sunday)    Plan discussed with primary team.

## 2023-10-20 NOTE — PROGRESS NOTE ADULT - SUBJECTIVE AND OBJECTIVE BOX
Ortho Floor Note    Procedure: PSF L3-S1, L5-S1 TLIF  Surgeon: Schwab    Patient tired this AM, eyes closing during exam, slightly slurred speech. Received Oxy 10 given at 0526. Still able to participate in exam with full strength.  Denies CP, SOB, N/V, numbness/tingling     Vital Signs Last 24 Hrs  T(C): 36.8 (20 Oct 2023 05:05), Max: 37 (20 Oct 2023 00:26)  T(F): 98.2 (20 Oct 2023 05:05), Max: 98.6 (20 Oct 2023 00:26)  HR: 78 (20 Oct 2023 05:05) (65 - 85)  BP: 100/61 (20 Oct 2023 05:05) (100/61 - 129/67)  BP(mean): --  RR: 18 (20 Oct 2023 05:05) (17 - 18)  SpO2: 96% (20 Oct 2023 05:05) (96% - 100%)    Parameters below as of 20 Oct 2023 05:05  Patient On (Oxygen Delivery Method): room air    Physical Exam:  General: Pt Alert and oriented, NAD  HVx2, JPx1  [place]  BLE:  Pulses: 2+ radial or dp, pt pulses, wwp, cap refill <3 sec  B/l LE:  Sensation: SILT L2-S1  Motor:   L2 (hip flexion)  5/5   L3 (knee extension)  5/5   L4 (ankle dorsiflexion)  5/5   L5 (long toe extension) 5/5   S1 (ankle plantar flexion) 5/5      A/P: 68yFemale s/p  PSF L3-S1, L5-S1 TLIF with Dr. Schwab on 10/17.  - Stable  - Pain Control  - f/u AM labs  - DVT ppx: SCD, Lovenox POD1  - Post op abx: periop ancef  - PT, WBS: WBAT  - Monitor drain output - HV 70/185, HV 5/23, BRANDON 25/43  - Dispo: HPT

## 2023-10-20 NOTE — PROGRESS NOTE ADULT - SUBJECTIVE AND OBJECTIVE BOX
SUBJECTIVE: NAEON. Patient feels effects of medications - states feels hazy. She continues to have itchiness (especially with IV hydromorphone) but has not been receiving hydroxyzine as frequently as it is ordered. Passing gas but has not had BM yet. Voiding spontaneously.     MEDICATIONS:  MEDICATIONS  (STANDING):  acetaminophen     Tablet .. 1000 milliGRAM(s) Oral every 8 hours  chlorhexidine 2% Cloths 1 Application(s) Topical every 12 hours  dexAMETHasone  Injectable 6 milliGRAM(s) IV Push every 6 hours  DULoxetine 30 milliGRAM(s) Oral daily  enoxaparin Injectable 40 milliGRAM(s) SubCutaneous every 24 hours  lactated ringers. 1000 milliLiter(s) (100 mL/Hr) IV Continuous <Continuous>  loratadine 10 milliGRAM(s) Oral daily  melatonin 5 milliGRAM(s) Oral at bedtime  ondansetron   Disintegrating Tablet 4 milliGRAM(s) Oral every 6 hours  pantoprazole    Tablet 40 milliGRAM(s) Oral before breakfast  polyethylene glycol 3350 17 Gram(s) Oral at bedtime  senna 2 Tablet(s) Oral at bedtime    MEDICATIONS  (PRN):  aluminum hydroxide/magnesium hydroxide/simethicone Suspension 30 milliLiter(s) Oral every 12 hours PRN Indigestion  bisacodyl 5 milliGRAM(s) Oral every 12 hours PRN Constipation  bisacodyl Suppository 10 milliGRAM(s) Rectal daily PRN Constipation  magnesium hydroxide Suspension 30 milliLiter(s) Oral every 12 hours PRN Constipation  oxyCODONE    IR 5 milliGRAM(s) Oral every 4 hours PRN Moderate Pain (4 - 6)  oxyCODONE    IR 10 milliGRAM(s) Oral every 4 hours PRN Severe Pain (7 - 10)  simethicone 80 milliGRAM(s) Chew every 6 hours PRN Gas  tiZANidine 2 milliGRAM(s) Oral <User Schedule> PRN Muscle Spasm      Allergies    sulfa drugs (Unknown)  codeine (Vomiting)  Lexapro (Swelling)  morphine (Vomiting)  Demerol HCl (Other)    Intolerances        OBJECTIVE:  Vital Signs Last 24 Hrs  T(C): 36.6 (20 Oct 2023 08:28), Max: 37 (20 Oct 2023 00:26)  T(F): 97.8 (20 Oct 2023 08:28), Max: 98.6 (20 Oct 2023 00:26)  HR: 63 (20 Oct 2023 08:28) (63 - 85)  BP: 95/58 (20 Oct 2023 08:28) (95/58 - 129/67)  BP(mean): --  RR: 17 (20 Oct 2023 08:28) (17 - 18)  SpO2: 95% (20 Oct 2023 08:28) (95% - 100%)    Parameters below as of 20 Oct 2023 08:28  Patient On (Oxygen Delivery Method): room air      I&O's Summary    19 Oct 2023 07:01  -  20 Oct 2023 07:00  --------------------------------------------------------  IN: 360 mL / OUT: 811 mL / NET: -451 mL        PHYSICAL EXAM:  Gen: appears stated age, resting comfortably, NAD  HEENT: NCAT, MMM  Neck: supple  CV: RRR, no m/r/g, peripheral pulses 2+  Pulm: CTAB, no increased work of breathing, no rales/rhonchi  Abd: soft, ND, NT, no rebound or guarding  Skin: warm and dry, no rash  Ext: non-tender, no edema; back dressing CDI with drains in place  Neuro: AOx3, speaking in full sentences  Psych: affect and behavior appropriate, pleasant at time of interview    LABS:                        8.0    9.74  )-----------( 245      ( 20 Oct 2023 06:27 )             24.6     10-20    138  |  102  |  19  ----------------------------<  118<H>  4.4   |  28  |  0.80    Ca    8.8      20 Oct 2023 06:27          CAPILLARY BLOOD GLUCOSE        Urinalysis Basic - ( 20 Oct 2023 06:27 )    Color: x / Appearance: x / SG: x / pH: x  Gluc: 118 mg/dL / Ketone: x  / Bili: x / Urobili: x   Blood: x / Protein: x / Nitrite: x   Leuk Esterase: x / RBC: x / WBC x   Sq Epi: x / Non Sq Epi: x / Bacteria: x        MICRODATA:      RADIOLOGY/OTHER STUDIES:

## 2023-10-21 LAB
ANION GAP SERPL CALC-SCNC: 4 MMOL/L — LOW (ref 5–17)
ANION GAP SERPL CALC-SCNC: 4 MMOL/L — LOW (ref 5–17)
BUN SERPL-MCNC: 15 MG/DL — SIGNIFICANT CHANGE UP (ref 7–23)
BUN SERPL-MCNC: 15 MG/DL — SIGNIFICANT CHANGE UP (ref 7–23)
CALCIUM SERPL-MCNC: 9.2 MG/DL — SIGNIFICANT CHANGE UP (ref 8.4–10.5)
CALCIUM SERPL-MCNC: 9.2 MG/DL — SIGNIFICANT CHANGE UP (ref 8.4–10.5)
CHLORIDE SERPL-SCNC: 100 MMOL/L — SIGNIFICANT CHANGE UP (ref 96–108)
CHLORIDE SERPL-SCNC: 100 MMOL/L — SIGNIFICANT CHANGE UP (ref 96–108)
CO2 SERPL-SCNC: 32 MMOL/L — HIGH (ref 22–31)
CO2 SERPL-SCNC: 32 MMOL/L — HIGH (ref 22–31)
CREAT SERPL-MCNC: 0.81 MG/DL — SIGNIFICANT CHANGE UP (ref 0.5–1.3)
CREAT SERPL-MCNC: 0.81 MG/DL — SIGNIFICANT CHANGE UP (ref 0.5–1.3)
EGFR: 79 ML/MIN/1.73M2 — SIGNIFICANT CHANGE UP
EGFR: 79 ML/MIN/1.73M2 — SIGNIFICANT CHANGE UP
GLUCOSE SERPL-MCNC: 106 MG/DL — HIGH (ref 70–99)
GLUCOSE SERPL-MCNC: 106 MG/DL — HIGH (ref 70–99)
HCT VFR BLD CALC: 28.8 % — LOW (ref 34.5–45)
HCT VFR BLD CALC: 28.8 % — LOW (ref 34.5–45)
HGB BLD-MCNC: 9.3 G/DL — LOW (ref 11.5–15.5)
HGB BLD-MCNC: 9.3 G/DL — LOW (ref 11.5–15.5)
MCHC RBC-ENTMCNC: 29.6 PG — SIGNIFICANT CHANGE UP (ref 27–34)
MCHC RBC-ENTMCNC: 29.6 PG — SIGNIFICANT CHANGE UP (ref 27–34)
MCHC RBC-ENTMCNC: 32.3 GM/DL — SIGNIFICANT CHANGE UP (ref 32–36)
MCHC RBC-ENTMCNC: 32.3 GM/DL — SIGNIFICANT CHANGE UP (ref 32–36)
MCV RBC AUTO: 91.7 FL — SIGNIFICANT CHANGE UP (ref 80–100)
MCV RBC AUTO: 91.7 FL — SIGNIFICANT CHANGE UP (ref 80–100)
NRBC # BLD: 0 /100 WBCS — SIGNIFICANT CHANGE UP (ref 0–0)
NRBC # BLD: 0 /100 WBCS — SIGNIFICANT CHANGE UP (ref 0–0)
PLATELET # BLD AUTO: 285 K/UL — SIGNIFICANT CHANGE UP (ref 150–400)
PLATELET # BLD AUTO: 285 K/UL — SIGNIFICANT CHANGE UP (ref 150–400)
POTASSIUM SERPL-MCNC: 4.9 MMOL/L — SIGNIFICANT CHANGE UP (ref 3.5–5.3)
POTASSIUM SERPL-MCNC: 4.9 MMOL/L — SIGNIFICANT CHANGE UP (ref 3.5–5.3)
POTASSIUM SERPL-SCNC: 4.9 MMOL/L — SIGNIFICANT CHANGE UP (ref 3.5–5.3)
POTASSIUM SERPL-SCNC: 4.9 MMOL/L — SIGNIFICANT CHANGE UP (ref 3.5–5.3)
RBC # BLD: 3.14 M/UL — LOW (ref 3.8–5.2)
RBC # BLD: 3.14 M/UL — LOW (ref 3.8–5.2)
RBC # FLD: 12.2 % — SIGNIFICANT CHANGE UP (ref 10.3–14.5)
RBC # FLD: 12.2 % — SIGNIFICANT CHANGE UP (ref 10.3–14.5)
SODIUM SERPL-SCNC: 136 MMOL/L — SIGNIFICANT CHANGE UP (ref 135–145)
SODIUM SERPL-SCNC: 136 MMOL/L — SIGNIFICANT CHANGE UP (ref 135–145)
WBC # BLD: 9.48 K/UL — SIGNIFICANT CHANGE UP (ref 3.8–10.5)
WBC # BLD: 9.48 K/UL — SIGNIFICANT CHANGE UP (ref 3.8–10.5)
WBC # FLD AUTO: 9.48 K/UL — SIGNIFICANT CHANGE UP (ref 3.8–10.5)
WBC # FLD AUTO: 9.48 K/UL — SIGNIFICANT CHANGE UP (ref 3.8–10.5)

## 2023-10-21 PROCEDURE — 99232 SBSQ HOSP IP/OBS MODERATE 35: CPT | Mod: GC

## 2023-10-21 RX ORDER — MINERAL OIL
30 OIL (ML) MISCELLANEOUS DAILY
Refills: 0 | Status: DISCONTINUED | OUTPATIENT
Start: 2023-10-21 | End: 2023-10-22

## 2023-10-21 RX ORDER — ACETAMINOPHEN 500 MG
2 TABLET ORAL
Qty: 0 | Refills: 0 | DISCHARGE
Start: 2023-10-21

## 2023-10-21 RX ORDER — OXYCODONE HYDROCHLORIDE 5 MG/1
1 TABLET ORAL
Qty: 42 | Refills: 0
Start: 2023-10-21 | End: 2023-10-27

## 2023-10-21 RX ORDER — TIZANIDINE 4 MG/1
1 TABLET ORAL
Qty: 28 | Refills: 0
Start: 2023-10-21 | End: 2023-11-03

## 2023-10-21 RX ORDER — SENNA PLUS 8.6 MG/1
2 TABLET ORAL
Qty: 0 | Refills: 0 | DISCHARGE
Start: 2023-10-21

## 2023-10-21 RX ADMIN — Medication 1000 MILLIGRAM(S): at 14:03

## 2023-10-21 RX ADMIN — SENNA PLUS 2 TABLET(S): 8.6 TABLET ORAL at 22:09

## 2023-10-21 RX ADMIN — Medication 1000 MILLIGRAM(S): at 22:08

## 2023-10-21 RX ADMIN — POLYETHYLENE GLYCOL 3350 17 GRAM(S): 17 POWDER, FOR SOLUTION ORAL at 22:09

## 2023-10-21 RX ADMIN — ENOXAPARIN SODIUM 40 MILLIGRAM(S): 100 INJECTION SUBCUTANEOUS at 22:09

## 2023-10-21 RX ADMIN — Medication 1000 MILLIGRAM(S): at 07:18

## 2023-10-21 RX ADMIN — DULOXETINE HYDROCHLORIDE 30 MILLIGRAM(S): 30 CAPSULE, DELAYED RELEASE ORAL at 22:09

## 2023-10-21 RX ADMIN — LORATADINE 10 MILLIGRAM(S): 10 TABLET ORAL at 14:03

## 2023-10-21 RX ADMIN — Medication 30 MILLILITER(S): at 14:03

## 2023-10-21 RX ADMIN — Medication 10 MILLIGRAM(S): at 20:23

## 2023-10-21 RX ADMIN — Medication 5 MILLIGRAM(S): at 22:09

## 2023-10-21 RX ADMIN — PANTOPRAZOLE SODIUM 40 MILLIGRAM(S): 20 TABLET, DELAYED RELEASE ORAL at 06:14

## 2023-10-21 NOTE — PROGRESS NOTE ADULT - SUBJECTIVE AND OBJECTIVE BOX
Ortho Note    Pt comfortable without complaints, pain controlled  Denies CP, SOB, N/V, new numbness/tingling    Endorses some light headness when going to restroom this am      Vital Signs Last 24 Hrs  T(C): 37.3 (10-21-23 @ 05:43), Max: 37.3 (10-21-23 @ 05:43)  T(F): 99.2 (10-21-23 @ 05:43), Max: 99.2 (10-21-23 @ 05:43)  HR: 77 (10-21-23 @ 05:43) (77 - 77)  BP: 122/60 (10-21-23 @ 05:43) (122/60 - 122/60)  BP(mean): --  RR: 18 (10-21-23 @ 05:43) (18 - 18)  SpO2: 94% (10-21-23 @ 05:43) (94% - 94%)  I&O's Summary    20 Oct 2023 07:01  -  21 Oct 2023 07:00  --------------------------------------------------------  IN: 0 mL / OUT: 50 mL / NET: -50 mL        Physical Exam:  General: Pt Alert and oriented, NAD  HVx1, JPx1  BLE:  Pulses: 2+ radial or dp, pt pulses, wwp, cap refill <3 sec  B/l LE:  Sensation: SILT L2-S1  Motor:   L2 (hip flexion)  5/5   L3 (knee extension)  5/5   L4 (ankle dorsiflexion)  5/5   L5 (long toe extension) 5/5   S1 (ankle plantar flexion) 5/5                        9.3    9.48  )-----------( 285      ( 21 Oct 2023 05:30 )             28.8     10-21    136  |  100  |  15  ----------------------------<  106<H>  4.9   |  32<H>  |  0.81    Ca    9.2      21 Oct 2023 05:30          A/P: 68yFemale s/p  PSF L3-S1, L5-S1 TLIF with Dr. Schwab on 10/17.  - Stable  - Pain Control  - f/u AM labs  - DVT ppx: SCD, Lovenox POD1  - Post op abx: periop ancef  - PT, WBS: WBAT  - Monitor drain output x2  - Dispo: HPT/OT, pending drains    Ortho Pager [phone]

## 2023-10-21 NOTE — PROGRESS NOTE ADULT - ASSESSMENT
68 YOF with PMH Of mood disorder, osteoporosis, lumbar stenosis s/p multiple back surgeries admitted for elective PSF L3-S1, L5-S1 TLIF s/p OR with Dr. Schwab on 10/17.    Chronic back pain  Lumbar stenosis s/p PSF L3-S1, L5-S1 TLIF  - management per ortho, s/p OR with Dr. Schwab on 10/17, drains in place, on steroids  - pain control with bowel regimen, frequent perioperative incentive spirometer use  - chemical DVT ppx with LMWH   - escalate bowel regimen - recommend Miralax BID and lactulose today     Leukocytosis, resolved  - afebrile, no s/s infection  - likely reactive 2/2 surgery and steroids  - monitor off antibiotics    Acute blood loss anemia  - Hgb 12.7 --> 8.9 --> 8.0  - HDS w/o active s/s bleeding  - likely partially 2/2 operative losses  - consider transfusion of pRBC if symptomatic (has not been dizzy/orthostatic with PT thus far)    Generalized pruritis  - adverse reaction to opioid pain medications  - no rash, no SOB, no GI sx  - no improvement with diphenhydramine, minimal with hydroxyzine  - trial loratadine 10mg daily     Mood disorder  - cont home duloxetine 30mg    Dispo: pending drain removal, home with HPT (likely Sunday)    Plan discussed with primary team.     68 YOF with PMH Of mood disorder, osteoporosis, lumbar stenosis s/p multiple back surgeries admitted for elective PSF L3-S1, L5-S1 TLIF s/p OR with Dr. Schwab on 10/17.    Chronic back pain  Lumbar stenosis s/p PSF L3-S1, L5-S1 TLIF  - management per ortho, s/p OR with Dr. Schwab on 10/17, drains in place, on steroids  - pain control with bowel regimen, frequent perioperative incentive spirometer use  - chemical DVT ppx with LMWH   - escalate bowel regimen - recommend Dulcolax suppository     Leukocytosis, resolved  - afebrile, no s/s infection  - likely reactive 2/2 surgery and steroids  - monitor off antibiotics    Acute blood loss anemia  - Hgb 12.7 --> 8.9 --> 8.0> 9.3  - HDS w/o active s/s bleeding  - likely partially 2/2 operative losses  - consider transfusion of pRBC if symptomatic (has not been dizzy/orthostatic with PT thus far)    Generalized pruritis  - adverse reaction to opioid pain medications  - no rash, no SOB, no GI sx  - no improvement with diphenhydramine, minimal with hydroxyzine  - trial loratadine 10mg daily     Mood disorder  - cont home duloxetine 30mg    Dispo: pending drain removal, home with [facility] (likely Sunday)    Plan discussed with primary team.

## 2023-10-21 NOTE — PROGRESS NOTE ADULT - SUBJECTIVE AND OBJECTIVE BOX
OVERNIGHT EVENTS:       MEDICATIONS  (STANDING):  acetaminophen     Tablet .. 1000 milliGRAM(s) Oral every 8 hours  chlorhexidine 2% Cloths 1 Application(s) Topical every 12 hours  dexAMETHasone  Injectable 6 milliGRAM(s) IV Push every 6 hours  DULoxetine 30 milliGRAM(s) Oral daily  enoxaparin Injectable 40 milliGRAM(s) SubCutaneous every 24 hours  lactated ringers. 1000 milliLiter(s) (100 mL/Hr) IV Continuous <Continuous>  loratadine 10 milliGRAM(s) Oral daily  melatonin 5 milliGRAM(s) Oral at bedtime  mineral oil 30 milliLiter(s) Oral daily  ondansetron   Disintegrating Tablet 4 milliGRAM(s) Oral every 6 hours  pantoprazole    Tablet 40 milliGRAM(s) Oral before breakfast  polyethylene glycol 3350 17 Gram(s) Oral at bedtime  senna 2 Tablet(s) Oral at bedtime    MEDICATIONS  (PRN):  aluminum hydroxide/magnesium hydroxide/simethicone Suspension 30 milliLiter(s) Oral every 12 hours PRN Indigestion  bisacodyl 5 milliGRAM(s) Oral every 12 hours PRN Constipation  bisacodyl Suppository 10 milliGRAM(s) Rectal daily PRN Constipation  magnesium hydroxide Suspension 30 milliLiter(s) Oral every 12 hours PRN Constipation  oxyCODONE    IR 10 milliGRAM(s) Oral every 4 hours PRN Severe Pain (7 - 10)  oxyCODONE    IR 5 milliGRAM(s) Oral every 4 hours PRN Moderate Pain (4 - 6)  simethicone 80 milliGRAM(s) Chew every 6 hours PRN Gas  tiZANidine 2 milliGRAM(s) Oral <User Schedule> PRN Muscle Spasm      REVIEW OF SYSTEMS:  CONSTITUTIONAL: No fever, weight loss, or fatigue  RESPIRATORY: No cough, wheezing, chills or hemoptysis; No shortness of breath  CARDIOVASCULAR: No chest pain, palpitations, dizziness, or leg swelling  GASTROINTESTINAL: No abdominal pain. No nausea, vomiting, or hematemesis; No diarrhea or constipation. No melena or hematochezia.  GENITOURINARY: No dysuria or hematuria, urinary frequency  NEUROLOGICAL: No headaches, memory loss, loss of strength, numbness, or tremors  SKIN: No itching, burning, rashes, or lesions     Vital Signs Last 24 Hrs  T(C): 37.2 (21 Oct 2023 08:40), Max: 37.3 (21 Oct 2023 05:43)  T(F): 98.9 (21 Oct 2023 08:40), Max: 99.2 (21 Oct 2023 05:43)  HR: 76 (21 Oct 2023 08:40) (68 - 79)  BP: 114/64 (21 Oct 2023 08:40) (114/64 - 149/55)  BP(mean): --  RR: 17 (21 Oct 2023 08:40) (17 - 18)  SpO2: 96% (21 Oct 2023 08:40) (94% - 96%)    Parameters below as of 21 Oct 2023 08:40  Patient On (Oxygen Delivery Method): room air        PHYSICAL EXAMINATION:  GENERAL: NAD, well built  HEAD:  Atraumatic, Normocephalic  EYES:  conjunctiva and sclera clear  NECK: Supple, No JVD  CHEST/LUNG: Clear to auscultation. Clear to percussion bilaterally; No rales, rhonchi, wheezing, or rubs  HEART: Regular rate and rhythm; No murmurs, rubs, or gallops  ABDOMEN: Soft, Nontender, Nondistended; Bowel sounds present  NERVOUS SYSTEM:  Alert & Oriented X3,    EXTREMITIES:  2+ Peripheral Pulses, No clubbing, cyanosis, or edema  SKIN: warm dry                          9.3    9.48  )-----------( 285      ( 21 Oct 2023 05:30 )             28.8     10-21    136  |  100  |  15  ----------------------------<  106<H>  4.9   |  32<H>  |  0.81    Ca    9.2      21 Oct 2023 05:30                CAPILLARY BLOOD GLUCOSE              RADIOLOGY & ADDITIONAL TESTS:                   OVERNIGHT EVENTS:   Patient states he feels well, better than yesterday. Pain is well controlled. Would like to receive a suppository today as still has not had a BM. Does not complain of pruritis     MEDICATIONS  (STANDING):  acetaminophen     Tablet .. 1000 milliGRAM(s) Oral every 8 hours  chlorhexidine 2% Cloths 1 Application(s) Topical every 12 hours  dexAMETHasone  Injectable 6 milliGRAM(s) IV Push every 6 hours  DULoxetine 30 milliGRAM(s) Oral daily  enoxaparin Injectable 40 milliGRAM(s) SubCutaneous every 24 hours  lactated ringers. 1000 milliLiter(s) (100 mL/Hr) IV Continuous <Continuous>  loratadine 10 milliGRAM(s) Oral daily  melatonin 5 milliGRAM(s) Oral at bedtime  mineral oil 30 milliLiter(s) Oral daily  ondansetron   Disintegrating Tablet 4 milliGRAM(s) Oral every 6 hours  pantoprazole    Tablet 40 milliGRAM(s) Oral before breakfast  polyethylene glycol 3350 17 Gram(s) Oral at bedtime  senna 2 Tablet(s) Oral at bedtime    MEDICATIONS  (PRN):  aluminum hydroxide/magnesium hydroxide/simethicone Suspension 30 milliLiter(s) Oral every 12 hours PRN Indigestion  bisacodyl 5 milliGRAM(s) Oral every 12 hours PRN Constipation  bisacodyl Suppository 10 milliGRAM(s) Rectal daily PRN Constipation  magnesium hydroxide Suspension 30 milliLiter(s) Oral every 12 hours PRN Constipation  oxyCODONE    IR 10 milliGRAM(s) Oral every 4 hours PRN Severe Pain (7 - 10)  oxyCODONE    IR 5 milliGRAM(s) Oral every 4 hours PRN Moderate Pain (4 - 6)  simethicone 80 milliGRAM(s) Chew every 6 hours PRN Gas  tiZANidine 2 milliGRAM(s) Oral <User Schedule> PRN Muscle Spasm    Vital Signs Last 24 Hrs  T(C): 37.2 (21 Oct 2023 08:40), Max: 37.3 (21 Oct 2023 05:43)  T(F): 98.9 (21 Oct 2023 08:40), Max: 99.2 (21 Oct 2023 05:43)  HR: 76 (21 Oct 2023 08:40) (68 - 79)  BP: 114/64 (21 Oct 2023 08:40) (114/64 - 149/55)  BP(mean): --  RR: 17 (21 Oct 2023 08:40) (17 - 18)  SpO2: 96% (21 Oct 2023 08:40) (94% - 96%)    Parameters below as of 21 Oct 2023 08:40  Patient On (Oxygen Delivery Method): room air    PHYSICAL EXAMINATION:  Gen: NAD  HEENT: NCAT, MMM  Neck: supple  CV: RRR, no m/r/g, peripheral pulses 2+  Pulm: CTAB, no increased work of breathing, no rales/rhonchi  Abd: soft, ND, NT, no rebound or guarding  Skin: warm and dry, no rash  Ext: non-tender, no edema; back dressing CDI with drains in place  Neuro: AOx3                            9.3    9.48  )-----------( 285      ( 21 Oct 2023 05:30 )             28.8     10-21    136  |  100  |  15  ----------------------------<  106<H>  4.9   |  32<H>  |  0.81    Ca    9.2      21 Oct 2023 05:30                CAPILLARY BLOOD GLUCOSE              RADIOLOGY & ADDITIONAL TESTS:

## 2023-10-22 ENCOUNTER — TRANSCRIPTION ENCOUNTER (OUTPATIENT)
Age: 68
End: 2023-10-22

## 2023-10-22 VITALS
OXYGEN SATURATION: 97 % | DIASTOLIC BLOOD PRESSURE: 72 MMHG | TEMPERATURE: 98 F | HEART RATE: 76 BPM | RESPIRATION RATE: 16 BRPM | SYSTOLIC BLOOD PRESSURE: 120 MMHG

## 2023-10-22 LAB
ANION GAP SERPL CALC-SCNC: 8 MMOL/L — SIGNIFICANT CHANGE UP (ref 5–17)
ANION GAP SERPL CALC-SCNC: 8 MMOL/L — SIGNIFICANT CHANGE UP (ref 5–17)
BUN SERPL-MCNC: 14 MG/DL — SIGNIFICANT CHANGE UP (ref 7–23)
BUN SERPL-MCNC: 14 MG/DL — SIGNIFICANT CHANGE UP (ref 7–23)
CALCIUM SERPL-MCNC: 9.4 MG/DL — SIGNIFICANT CHANGE UP (ref 8.4–10.5)
CALCIUM SERPL-MCNC: 9.4 MG/DL — SIGNIFICANT CHANGE UP (ref 8.4–10.5)
CHLORIDE SERPL-SCNC: 103 MMOL/L — SIGNIFICANT CHANGE UP (ref 96–108)
CHLORIDE SERPL-SCNC: 103 MMOL/L — SIGNIFICANT CHANGE UP (ref 96–108)
CO2 SERPL-SCNC: 27 MMOL/L — SIGNIFICANT CHANGE UP (ref 22–31)
CO2 SERPL-SCNC: 27 MMOL/L — SIGNIFICANT CHANGE UP (ref 22–31)
CREAT SERPL-MCNC: 0.73 MG/DL — SIGNIFICANT CHANGE UP (ref 0.5–1.3)
CREAT SERPL-MCNC: 0.73 MG/DL — SIGNIFICANT CHANGE UP (ref 0.5–1.3)
EGFR: 89 ML/MIN/1.73M2 — SIGNIFICANT CHANGE UP
EGFR: 89 ML/MIN/1.73M2 — SIGNIFICANT CHANGE UP
GLUCOSE SERPL-MCNC: 125 MG/DL — HIGH (ref 70–99)
GLUCOSE SERPL-MCNC: 125 MG/DL — HIGH (ref 70–99)
HCT VFR BLD CALC: 28.3 % — LOW (ref 34.5–45)
HCT VFR BLD CALC: 28.3 % — LOW (ref 34.5–45)
HGB BLD-MCNC: 9.3 G/DL — LOW (ref 11.5–15.5)
HGB BLD-MCNC: 9.3 G/DL — LOW (ref 11.5–15.5)
MCHC RBC-ENTMCNC: 30.1 PG — SIGNIFICANT CHANGE UP (ref 27–34)
MCHC RBC-ENTMCNC: 30.1 PG — SIGNIFICANT CHANGE UP (ref 27–34)
MCHC RBC-ENTMCNC: 32.9 GM/DL — SIGNIFICANT CHANGE UP (ref 32–36)
MCHC RBC-ENTMCNC: 32.9 GM/DL — SIGNIFICANT CHANGE UP (ref 32–36)
MCV RBC AUTO: 91.6 FL — SIGNIFICANT CHANGE UP (ref 80–100)
MCV RBC AUTO: 91.6 FL — SIGNIFICANT CHANGE UP (ref 80–100)
NRBC # BLD: 0 /100 WBCS — SIGNIFICANT CHANGE UP (ref 0–0)
NRBC # BLD: 0 /100 WBCS — SIGNIFICANT CHANGE UP (ref 0–0)
PLATELET # BLD AUTO: 326 K/UL — SIGNIFICANT CHANGE UP (ref 150–400)
PLATELET # BLD AUTO: 326 K/UL — SIGNIFICANT CHANGE UP (ref 150–400)
POTASSIUM SERPL-MCNC: 4.3 MMOL/L — SIGNIFICANT CHANGE UP (ref 3.5–5.3)
POTASSIUM SERPL-MCNC: 4.3 MMOL/L — SIGNIFICANT CHANGE UP (ref 3.5–5.3)
POTASSIUM SERPL-SCNC: 4.3 MMOL/L — SIGNIFICANT CHANGE UP (ref 3.5–5.3)
POTASSIUM SERPL-SCNC: 4.3 MMOL/L — SIGNIFICANT CHANGE UP (ref 3.5–5.3)
RBC # BLD: 3.09 M/UL — LOW (ref 3.8–5.2)
RBC # BLD: 3.09 M/UL — LOW (ref 3.8–5.2)
RBC # FLD: 12.2 % — SIGNIFICANT CHANGE UP (ref 10.3–14.5)
RBC # FLD: 12.2 % — SIGNIFICANT CHANGE UP (ref 10.3–14.5)
SODIUM SERPL-SCNC: 138 MMOL/L — SIGNIFICANT CHANGE UP (ref 135–145)
SODIUM SERPL-SCNC: 138 MMOL/L — SIGNIFICANT CHANGE UP (ref 135–145)
WBC # BLD: 9.36 K/UL — SIGNIFICANT CHANGE UP (ref 3.8–10.5)
WBC # BLD: 9.36 K/UL — SIGNIFICANT CHANGE UP (ref 3.8–10.5)
WBC # FLD AUTO: 9.36 K/UL — SIGNIFICANT CHANGE UP (ref 3.8–10.5)
WBC # FLD AUTO: 9.36 K/UL — SIGNIFICANT CHANGE UP (ref 3.8–10.5)

## 2023-10-22 RX ORDER — SENNA PLUS 8.6 MG/1
2 TABLET ORAL
Qty: 28 | Refills: 0
Start: 2023-10-22 | End: 2023-11-04

## 2023-10-22 RX ORDER — TIZANIDINE 4 MG/1
1 TABLET ORAL
Qty: 28 | Refills: 0
Start: 2023-10-22 | End: 2023-11-04

## 2023-10-22 RX ORDER — OXYCODONE HYDROCHLORIDE 5 MG/1
1 TABLET ORAL
Qty: 42 | Refills: 0
Start: 2023-10-22 | End: 2023-10-28

## 2023-10-22 RX ORDER — MELOXICAM 15 MG/1
1 TABLET ORAL
Qty: 0 | Refills: 0 | DISCHARGE

## 2023-10-22 RX ORDER — ACETAMINOPHEN 500 MG
2 TABLET ORAL
Qty: 84 | Refills: 0
Start: 2023-10-22 | End: 2023-11-04

## 2023-10-22 RX ADMIN — OXYCODONE HYDROCHLORIDE 5 MILLIGRAM(S): 5 TABLET ORAL at 12:05

## 2023-10-22 RX ADMIN — OXYCODONE HYDROCHLORIDE 5 MILLIGRAM(S): 5 TABLET ORAL at 12:50

## 2023-10-22 RX ADMIN — Medication 1000 MILLIGRAM(S): at 05:38

## 2023-10-22 RX ADMIN — PANTOPRAZOLE SODIUM 40 MILLIGRAM(S): 20 TABLET, DELAYED RELEASE ORAL at 05:38

## 2023-10-22 RX ADMIN — LORATADINE 10 MILLIGRAM(S): 10 TABLET ORAL at 11:53

## 2023-10-22 RX ADMIN — ONDANSETRON 4 MILLIGRAM(S): 8 TABLET, FILM COATED ORAL at 11:53

## 2023-10-22 NOTE — DISCHARGE NOTE NURSING/CASE MANAGEMENT/SOCIAL WORK - PATIENT PORTAL LINK FT
You can access the FollowMyHealth Patient Portal offered by [facility] by registering at the following website: [email or web address]. By joining [facility]’s FollowMyHealth portal, you will also be able to view your health information using other applications (apps) compatible with our system.

## 2023-10-22 NOTE — PROGRESS NOTE ADULT - REASON FOR ADMISSION
lower back pain

## 2023-10-22 NOTE — PROGRESS NOTE ADULT - SUBJECTIVE AND OBJECTIVE BOX
Ortho Note    Pt endorses pain radiating down LLE which was also present preop but otherwise no complaints, states she has been walking well. Denies CP, SOB, N/V, new numbness/tingling      Vital Signs Last 24 Hrs  T(C): 36.8 (22 Oct 2023 05:08), Max: 37.2 (21 Oct 2023 08:40)  T(F): 98.3 (22 Oct 2023 05:08), Max: 99 (21 Oct 2023 20:58)  HR: 65 (22 Oct 2023 05:08) (65 - 82)  BP: 122/71 (22 Oct 2023 05:08) (111/65 - 122/71)  BP(mean): 80 (21 Oct 2023 20:58) (80 - 80)  RR: 17 (22 Oct 2023 05:08) (17 - 17)  SpO2: 97% (22 Oct 2023 05:08) (96% - 98%)    Parameters below as of 22 Oct 2023 05:08  Patient On (Oxygen Delivery Method): room air      Physical Exam:  General: Pt Alert and oriented, NAD  Aquacels, gauze dressings c/d/i. JPx1 in place with minimal output   BLE:  Pulses: 2+ radial or dp, pt pulses, wwp, cap refill <3 sec  B/l LE:  Sensation: SILT L2-S1  Motor:   L2 (hip flexion)  5/5   L3 (knee extension)  5/5   L4 (ankle dorsiflexion)  5/5   L5 (long toe extension) 5/5   S1 (ankle plantar flexion) 5/5             Labs:                      9.3    9.36  )-----------( 326      ( 22 Oct 2023 05:27 )             28.3     10-22    138  |  103  |  14  ----------------------------<  125<H>  4.3   |  27  |  0.73    Ca    9.4      22 Oct 2023 05:27      A/P: 68yFemale s/p  PSF L3-S1, L5-S1 TLIF with Dr. Schwab on 10/17.  - Stable  - Pain Control  - f/u AM labs  - DVT ppx: SCD, Lovenox POD1  - Post op abx: periop ancef  - PT, WBS: WBAT  - Pending drain removal   - Dispo: Home    Ortho Pager [phone]

## 2023-10-22 NOTE — PROGRESS NOTE ADULT - PROVIDER SPECIALTY LIST ADULT
Hospitalist
Orthopedics
Pain Medicine
Hospitalist
Internal Medicine
Orthopedics

## 2023-10-22 NOTE — DISCHARGE NOTE NURSING/CASE MANAGEMENT/SOCIAL WORK - NSDCPEFALRISK_GEN_ALL_CORE
For information on Fall & Injury Prevention, visit: [email or web address] OR  [email or web address] OR  https://www.cdc.gov/steadi/patient.html

## 2023-10-24 DIAGNOSIS — M51.17 INTERVERTEBRAL DISC DISORDERS WITH RADICULOPATHY, LUMBOSACRAL REGION: ICD-10-CM

## 2023-10-24 DIAGNOSIS — M50.30 OTHER CERVICAL DISC DEGENERATION, UNSPECIFIED CERVICAL REGION: ICD-10-CM

## 2023-10-24 DIAGNOSIS — M81.0 AGE-RELATED OSTEOPOROSIS WITHOUT CURRENT PATHOLOGICAL FRACTURE: ICD-10-CM

## 2023-10-24 DIAGNOSIS — G62.9 POLYNEUROPATHY, UNSPECIFIED: ICD-10-CM

## 2023-10-24 DIAGNOSIS — R42 DIZZINESS AND GIDDINESS: ICD-10-CM

## 2023-10-24 DIAGNOSIS — J32.9 CHRONIC SINUSITIS, UNSPECIFIED: ICD-10-CM

## 2023-10-24 DIAGNOSIS — F39 UNSPECIFIED MOOD [AFFECTIVE] DISORDER: ICD-10-CM

## 2023-10-24 DIAGNOSIS — F41.9 ANXIETY DISORDER, UNSPECIFIED: ICD-10-CM

## 2023-10-24 DIAGNOSIS — R47.81 SLURRED SPEECH: ICD-10-CM

## 2023-10-24 DIAGNOSIS — Z88.2 ALLERGY STATUS TO SULFONAMIDES: ICD-10-CM

## 2023-10-24 DIAGNOSIS — M41.57 OTHER SECONDARY SCOLIOSIS, LUMBOSACRAL REGION: ICD-10-CM

## 2023-10-24 DIAGNOSIS — Z88.8 ALLERGY STATUS TO OTHER DRUGS, MEDICAMENTS AND BIOLOGICAL SUBSTANCES: ICD-10-CM

## 2023-10-24 DIAGNOSIS — Z79.1 LONG TERM (CURRENT) USE OF NON-STEROIDAL ANTI-INFLAMMATORIES (NSAID): ICD-10-CM

## 2023-10-24 DIAGNOSIS — D62 ACUTE POSTHEMORRHAGIC ANEMIA: ICD-10-CM

## 2023-10-24 DIAGNOSIS — E89.2 POSTPROCEDURAL HYPOPARATHYROIDISM: ICD-10-CM

## 2023-10-24 DIAGNOSIS — E86.0 DEHYDRATION: ICD-10-CM

## 2023-10-24 DIAGNOSIS — Z88.5 ALLERGY STATUS TO NARCOTIC AGENT: ICD-10-CM

## 2023-10-24 DIAGNOSIS — Z79.899 OTHER LONG TERM (CURRENT) DRUG THERAPY: ICD-10-CM

## 2023-10-24 DIAGNOSIS — M48.061 SPINAL STENOSIS, LUMBAR REGION WITHOUT NEUROGENIC CLAUDICATION: ICD-10-CM

## 2023-10-24 DIAGNOSIS — E55.9 VITAMIN D DEFICIENCY, UNSPECIFIED: ICD-10-CM

## 2023-10-24 DIAGNOSIS — M41.56 OTHER SECONDARY SCOLIOSIS, LUMBAR REGION: ICD-10-CM

## 2023-10-24 DIAGNOSIS — M43.17 SPONDYLOLISTHESIS, LUMBOSACRAL REGION: ICD-10-CM

## 2023-10-24 DIAGNOSIS — L29.8 OTHER PRURITUS: ICD-10-CM

## 2023-10-24 DIAGNOSIS — M48.062 SPINAL STENOSIS, LUMBAR REGION WITH NEUROGENIC CLAUDICATION: ICD-10-CM

## 2023-11-01 RX ORDER — METHOCARBAMOL 500 MG/1
500 TABLET, FILM COATED ORAL 3 TIMES DAILY
Qty: 30 | Refills: 0 | Status: COMPLETED | COMMUNITY
Start: 2023-11-01 | End: 2023-11-01

## 2023-11-01 RX ORDER — TIZANIDINE HYDROCHLORIDE 4 MG/1
4 CAPSULE ORAL
Qty: 30 | Refills: 0 | Status: ACTIVE | COMMUNITY
Start: 2023-11-01 | End: 1900-01-01

## 2023-11-13 PROCEDURE — 72100 X-RAY EXAM L-S SPINE 2/3 VWS: CPT

## 2023-11-13 PROCEDURE — 97530 THERAPEUTIC ACTIVITIES: CPT

## 2023-11-13 PROCEDURE — 86900 BLOOD TYPING SEROLOGIC ABO: CPT

## 2023-11-13 PROCEDURE — 80048 BASIC METABOLIC PNL TOTAL CA: CPT

## 2023-11-13 PROCEDURE — 85027 COMPLETE CBC AUTOMATED: CPT

## 2023-11-13 PROCEDURE — 97116 GAIT TRAINING THERAPY: CPT

## 2023-11-13 PROCEDURE — 82962 GLUCOSE BLOOD TEST: CPT

## 2023-11-13 PROCEDURE — C1713: CPT

## 2023-11-13 PROCEDURE — 97535 SELF CARE MNGMENT TRAINING: CPT

## 2023-11-13 PROCEDURE — 97161 PT EVAL LOW COMPLEX 20 MIN: CPT

## 2023-11-13 PROCEDURE — 76000 FLUOROSCOPY <1 HR PHYS/QHP: CPT

## 2023-11-13 PROCEDURE — 97165 OT EVAL LOW COMPLEX 30 MIN: CPT

## 2023-11-13 PROCEDURE — 86850 RBC ANTIBODY SCREEN: CPT

## 2023-11-13 PROCEDURE — 86803 HEPATITIS C AB TEST: CPT

## 2023-11-13 PROCEDURE — 36415 COLL VENOUS BLD VENIPUNCTURE: CPT

## 2023-11-13 PROCEDURE — 85025 COMPLETE CBC W/AUTO DIFF WBC: CPT

## 2023-11-13 PROCEDURE — 86901 BLOOD TYPING SEROLOGIC RH(D): CPT

## 2023-11-13 PROCEDURE — C1889: CPT

## 2023-11-20 ENCOUNTER — APPOINTMENT (OUTPATIENT)
Dept: ORTHOPEDIC SURGERY | Facility: CLINIC | Age: 68
End: 2023-11-20
Payer: MEDICARE

## 2023-11-20 ENCOUNTER — APPOINTMENT (OUTPATIENT)
Dept: ORTHOPEDIC SURGERY | Facility: HOSPITAL | Age: 68
End: 2023-11-20

## 2023-11-20 VITALS
SYSTOLIC BLOOD PRESSURE: 131 MMHG | WEIGHT: 129 LBS | HEIGHT: 62 IN | HEART RATE: 91 BPM | BODY MASS INDEX: 23.74 KG/M2 | DIASTOLIC BLOOD PRESSURE: 70 MMHG | OXYGEN SATURATION: 99 %

## 2023-11-20 DIAGNOSIS — M25.551 PAIN IN RIGHT HIP: ICD-10-CM

## 2023-11-20 PROCEDURE — 99213 OFFICE O/P EST LOW 20 MIN: CPT | Mod: 25

## 2023-11-20 PROCEDURE — 20611 DRAIN/INJ JOINT/BURSA W/US: CPT | Mod: RT

## 2023-11-20 PROCEDURE — 99024 POSTOP FOLLOW-UP VISIT: CPT

## 2023-11-20 PROCEDURE — 72100 X-RAY EXAM L-S SPINE 2/3 VWS: CPT

## 2023-11-22 RX ORDER — DIAZEPAM 5 MG/1
5 TABLET ORAL EVERY 8 HOURS
Qty: 14 | Refills: 0 | Status: ACTIVE | COMMUNITY
Start: 2023-11-22 | End: 1900-01-01

## 2023-12-10 ENCOUNTER — NON-APPOINTMENT (OUTPATIENT)
Age: 68
End: 2023-12-10

## 2024-01-15 ENCOUNTER — APPOINTMENT (OUTPATIENT)
Dept: ORTHOPEDIC SURGERY | Facility: CLINIC | Age: 69
End: 2024-01-15

## 2024-01-17 ENCOUNTER — NON-APPOINTMENT (OUTPATIENT)
Age: 69
End: 2024-01-17

## 2024-01-18 ENCOUNTER — APPOINTMENT (OUTPATIENT)
Dept: ORTHOPEDIC SURGERY | Facility: CLINIC | Age: 69
End: 2024-01-18
Payer: MEDICARE

## 2024-01-18 VITALS
WEIGHT: 127 LBS | HEIGHT: 62 IN | SYSTOLIC BLOOD PRESSURE: 146 MMHG | DIASTOLIC BLOOD PRESSURE: 70 MMHG | OXYGEN SATURATION: 85 % | HEART RATE: 145 BPM | BODY MASS INDEX: 23.37 KG/M2

## 2024-01-18 PROCEDURE — 99212 OFFICE O/P EST SF 10 MIN: CPT

## 2024-01-18 PROCEDURE — 72100 X-RAY EXAM L-S SPINE 2/3 VWS: CPT

## 2024-01-19 ENCOUNTER — TRANSCRIPTION ENCOUNTER (OUTPATIENT)
Age: 69
End: 2024-01-19

## 2024-01-23 NOTE — PHYSICAL EXAM
[de-identified] : NVI.  +TTP R GT bursa.  [de-identified] : Lumbar xrays 11/20/2023:  Instrumentation intact, no halos about the screws.

## 2024-01-23 NOTE — DISCUSSION/SUMMARY
[de-identified] : A lengthy discussion was had with the patient regarding her anticipated recovery.   We have advised  to increase her ambulation tolerance and to remain active.  Furthermore, we have advised the patient to refrain from bending, lifting, or twisting.   The patient's questions were sought and answered satisfactorily.  Cont PT.  Rx R GT bursa inj with Dr. Hines.  F/u in 3 months.   I, Elisa Dela Cruz NP am acting as a scribe for Dr. Frank Schwab.

## 2024-01-23 NOTE — HISTORY OF PRESENT ILLNESS
[de-identified] : Pt is approx 4 weeks s/p lumbar spinal fusion.   About 1 week ago, developed severe spasm in R thigh.  It has been difficult to weight bear on that side.  Has been in PT.  Prior to that, she was improving.  Walking regularly.

## 2024-02-14 NOTE — HISTORY OF PRESENT ILLNESS
[de-identified] : Pt is approximately 3 months s/p lumbar spinal fusion.  She has been doing well, at times his buttock pain.  Saw Dr Hines and had R GT inj which helped greatly.  She fainted and fell a few weeks after surgery and injured her R knee.  Diagnosed with mild cardiac disease, R knee pain improved with [facility] rehab.  She is active and walking regularly, exercising.   Taking no pain meds.

## 2024-02-14 NOTE — DISCUSSION/SUMMARY
[de-identified] : A lengthy discussion was had with the patient regarding her condition.  Continue with regular exercise and walking program.   F/u in 3 months.  The patient's questions were sought and answered satisfactorily.   IElisa NP am acting as a scribe for Dr. Frank Schwab.

## 2024-02-14 NOTE — PHYSICAL EXAM
[de-identified] : NVI.  Well healed incision. [de-identified] : Lumbar xray 1/18/2024:  Instrumentation intact, no halos about the screws.

## 2024-03-18 NOTE — PRE-ANESTHESIA EVALUATION ADULT - NSANTHDISPORD_GEN_ALL_CORE
Called and spoke with pt, who states that the redness in the eye itself, but worse in the right eye. Pt state that her eyes are looking bloodshot. Pt has not noticed any swelling, or rash, or shortness of breath. Pt will make an appt with the ophthalmologist.   PACU

## 2024-05-16 ENCOUNTER — APPOINTMENT (OUTPATIENT)
Dept: ORTHOPEDIC SURGERY | Facility: CLINIC | Age: 69
End: 2024-05-16
Payer: MEDICARE

## 2024-05-16 DIAGNOSIS — Z98.1 ARTHRODESIS STATUS: ICD-10-CM

## 2024-05-16 PROCEDURE — 99214 OFFICE O/P EST MOD 30 MIN: CPT

## 2024-05-16 PROCEDURE — 72100 X-RAY EXAM L-S SPINE 2/3 VWS: CPT

## 2024-05-20 PROBLEM — Z98.1 S/P LUMBAR SPINAL FUSION: Status: ACTIVE | Noted: 2023-11-06

## 2024-05-23 NOTE — PHYSICAL EXAM
[de-identified] : NVI.  Well healed incision. [de-identified] : Lumbar xrays 5/16/2024:   Instrumentation intact, no halos about the screws.

## 2024-05-23 NOTE — DISCUSSION/SUMMARY
Parents given dc instructions verbally and dc handout given. Parents verbalized understanding of when to return to ed. No additional questions   [de-identified] : A lengthy discussion was had with the patient regarding her condition.  Rx PT- include hip stretching.   F/u in 3-4 months.  The patient's questions were sought and answered satisfactorily.  IElisa NP am acting as a scribe for Dr. Frank Schwab.

## 2024-05-23 NOTE — HISTORY OF PRESENT ILLNESS
[de-identified] : Pt is approximately 7 months s/p lumbar spinal fusion.  She is back from [place], she walked a lot when there.   Has no significant low back pain.  In PT and has tight hips.   Takes no pain meds.

## 2024-08-27 ENCOUNTER — NON-APPOINTMENT (OUTPATIENT)
Age: 69
End: 2024-08-27

## 2024-09-05 ENCOUNTER — NON-APPOINTMENT (OUTPATIENT)
Age: 69
End: 2024-09-05

## 2024-09-06 ENCOUNTER — APPOINTMENT (OUTPATIENT)
Dept: ORTHOPEDIC SURGERY | Facility: CLINIC | Age: 69
End: 2024-09-06
Payer: MEDICARE

## 2024-09-06 DIAGNOSIS — Z98.1 ARTHRODESIS STATUS: ICD-10-CM

## 2024-09-06 PROCEDURE — 99213 OFFICE O/P EST LOW 20 MIN: CPT

## 2024-09-09 NOTE — DISCUSSION/SUMMARY
[de-identified] : A lengthy discussion was had with the patient regarding her condition.  Continue with regular exercise and walking program.  F/u 6 months w xray. The patient's questions were sought and answered satisfactorily.  IElisa NP am acting as a scribe for Dr. Frank Schwab.

## 2024-09-09 NOTE — PHYSICAL EXAM
[de-identified] : deferred- tele [de-identified] : Lumbar xrays 8/30/24 (orthopacs):  Instrumentation intact, no halos about the screws.

## 2024-09-09 NOTE — HISTORY OF PRESENT ILLNESS
[de-identified] : Pt presents via telehealth in follow-up.  She is approximately 11 months s/p lumbar spinal fusion.  She fainted a fell x 2.  In the middle of the night, she had syncope. Was worked up previously by cardiology.  Has no significant back pain, occasionally L low back pain.  Is exercising, [employer].  Walking regularly.

## 2024-09-30 NOTE — PRE-OP CHECKLIST - NS PREOP CHK HIBICLENS NA
1:

## (undated) DEVICE — NDL SPINAL 18G X 3.5" (PINK)

## (undated) DEVICE — BEAVER BLADE MINI (ORANGE)

## (undated) DEVICE — DRAPE 3/4 SHEET 52X76"

## (undated) DEVICE — DRAPE 1/2 SHEET 40X57"

## (undated) DEVICE — VENODYNE/SCD SLEEVE CALF LARGE

## (undated) DEVICE — MIDAS REX MR8 BALL FLUTED LG BORE 5MM X 14CM

## (undated) DEVICE — SUT ETHILON 5-0 18" PS-2

## (undated) DEVICE — GLV 7.5 PROTEXIS (WHITE)

## (undated) DEVICE — DRAPE BACK TABLE COVER 80X90"

## (undated) DEVICE — SUT PDS II 2-0 27" CT-2

## (undated) DEVICE — SUT MONOCRYL 3-0 18" PS-1

## (undated) DEVICE — DRAIN JACKSON PRATT 10MM FLAT 3/4 NO TROCAR

## (undated) DEVICE — NDL HYPO REGULAR BEVEL 25G X 1.5" (BLUE)

## (undated) DEVICE — PACK LIJ BASIC ORTHO

## (undated) DEVICE — STAPLER SKIN PROXIMATE

## (undated) DEVICE — NDL T HANDLE JAMSHIDI 11G 6"

## (undated) DEVICE — WARMING BLANKET UPPER ADULT

## (undated) DEVICE — DRAIN JACKSON PRATT 3 SPRING RESERVOIR W 10FR PVC DRAIN

## (undated) DEVICE — CANISTER DISPOSABLE THIN WALL 3000CC

## (undated) DEVICE — DRSG BIOPATCH DISK W CHG 1" W 4.0MM HOLE

## (undated) DEVICE — DRAPE INSTRUMENT POUCH 6.75" X 11"

## (undated) DEVICE — DRAIN RESERVOIR FOR JACKSON PRATT 100CC CARDINAL

## (undated) DEVICE — SUT VICRYL 3-0 18" PS-2 UNDYED

## (undated) DEVICE — SUT SILK 2-0 30" PSL

## (undated) DEVICE — GOWN TRIMAX XXL

## (undated) DEVICE — LABELS BLANK W PEN

## (undated) DEVICE — DRSG CURITY GAUZE SPONGE 4 X 4" 12-PLY

## (undated) DEVICE — DRAPE ISOLATION W INCISE FILM & POUCH

## (undated) DEVICE — SYR LUER LOK 10CC

## (undated) DEVICE — DRSG STOCKINETTE TUBULAR 6"

## (undated) DEVICE — STRYKER BONE MILL BLADE MEDIUM 5.0MM

## (undated) DEVICE — SUT VICRYL 2-0 27" CT-1 UNDYED

## (undated) DEVICE — ELCTR BOVIE PENCIL BLADE 10FT

## (undated) DEVICE — TOURNIQUET CUFF 18" DUAL PORT SINGLE BLADDER LUER LOCK (BLACK)

## (undated) DEVICE — SUCTION YANKAUER NO CONTROL VENT

## (undated) DEVICE — FRAZIER SUCTION TIP 8FR

## (undated) DEVICE — SUT VICRYL 1 27" OS-8 UNDYED

## (undated) DEVICE — SUT STRATAFIX SYMMETRIC PDS 1 45CM OS-6

## (undated) DEVICE — POSITIONER STRAP ARMBOARD VELCRO TS-30

## (undated) DEVICE — GLV 8 PROTEXIS (WHITE)

## (undated) DEVICE — DRSG KLING 2"

## (undated) DEVICE — NDL HYPO SAFE 18G X 1.5" (PINK)

## (undated) DEVICE — MIDAS REX MR8 MATCH HEAD FLUTED LG BORE 3MM X 14CM

## (undated) DEVICE — TOURNIQUET ESMARK 4"

## (undated) DEVICE — ELCTR AQUAMANTYS BIPOLAR SEALER 6.0

## (undated) DEVICE — VENODYNE/SCD SLEEVE CALF MEDIUM

## (undated) DEVICE — DRSG WEBRIL 4"

## (undated) DEVICE — DRSG MASTISOL

## (undated) DEVICE — SOL IRR POUR NS 0.9% 500ML

## (undated) DEVICE — Device

## (undated) DEVICE — BLADE SURGICAL #15 CARBON

## (undated) DEVICE — VENODYNE/SCD SLEEVE CALF BARIATRIC

## (undated) DEVICE — DRSG ACE BANDAGE 4" NS

## (undated) DEVICE — SUT PDS II 0 27" CT-1

## (undated) DEVICE — SAW BLADE MICROAIRE SAGITTAL 9.4MMX25.4MMX0.6MM

## (undated) DEVICE — SUT VICRYL 3-0 18" TIES UNDYED

## (undated) DEVICE — BUR STRYKER OVAL 4 X 38MM

## (undated) DEVICE — PACK SPINE

## (undated) DEVICE — SUT VICRYL 2-0 27" SH UNDYED

## (undated) DEVICE — SUT MONOCRYL 4-0 27" PS-2 UNDYED

## (undated) DEVICE — SUT VICRYL 1 36" CTB-1 UNDYED

## (undated) DEVICE — GLV 8.5 PROTEXIS ORTHO (CREAM)

## (undated) DEVICE — ELCTR GROUNDING PAD ADULT COVIDIEN

## (undated) DEVICE — DRSG STERISTRIPS 0.25 X 3"

## (undated) DEVICE — PREP DURAPREP 26CC

## (undated) DEVICE — PREP CHLORAPREP HI-LITE ORANGE 26ML